# Patient Record
Sex: FEMALE | Race: BLACK OR AFRICAN AMERICAN | NOT HISPANIC OR LATINO | Employment: FULL TIME | URBAN - METROPOLITAN AREA
[De-identification: names, ages, dates, MRNs, and addresses within clinical notes are randomized per-mention and may not be internally consistent; named-entity substitution may affect disease eponyms.]

---

## 2023-05-25 ENCOUNTER — OFFICE VISIT (OUTPATIENT)
Dept: PRIMARY CARE | Facility: CLINIC | Age: 58
End: 2023-05-25
Payer: COMMERCIAL

## 2023-05-25 VITALS
WEIGHT: 207 LBS | SYSTOLIC BLOOD PRESSURE: 163 MMHG | OXYGEN SATURATION: 95 % | TEMPERATURE: 97.6 F | HEART RATE: 82 BPM | BODY MASS INDEX: 41.81 KG/M2 | DIASTOLIC BLOOD PRESSURE: 65 MMHG

## 2023-05-25 DIAGNOSIS — K80.20 GALLSTONES: Primary | ICD-10-CM

## 2023-05-25 DIAGNOSIS — Z12.31 BREAST CANCER SCREENING BY MAMMOGRAM: ICD-10-CM

## 2023-05-25 PROBLEM — M76.50 PATELLAR TENDINITIS: Status: ACTIVE | Noted: 2023-05-25

## 2023-05-25 PROBLEM — M45.9 ANKYLOSING SPONDYLITIS (MULTI): Status: ACTIVE | Noted: 2023-05-25

## 2023-05-25 PROBLEM — R51.9 ACUTE HEADACHE: Status: ACTIVE | Noted: 2023-05-25

## 2023-05-25 PROBLEM — M19.90 ARTHRITIS: Status: ACTIVE | Noted: 2023-05-25

## 2023-05-25 PROBLEM — L30.9 ECZEMA: Status: ACTIVE | Noted: 2023-05-25

## 2023-05-25 PROBLEM — M71.21: Status: ACTIVE | Noted: 2023-05-25

## 2023-05-25 PROBLEM — I10 HYPERTENSION: Status: ACTIVE | Noted: 2023-05-25

## 2023-05-25 PROBLEM — R51.9 FRONTAL HEADACHE: Status: ACTIVE | Noted: 2023-05-25

## 2023-05-25 PROBLEM — M79.673 HEEL PAIN: Status: ACTIVE | Noted: 2023-05-25

## 2023-05-25 PROBLEM — M22.2X1 PATELLOFEMORAL PAIN SYNDROME OF RIGHT KNEE: Status: ACTIVE | Noted: 2023-05-25

## 2023-05-25 PROBLEM — M54.50 CHRONIC LOW BACK PAIN: Status: ACTIVE | Noted: 2023-05-25

## 2023-05-25 PROBLEM — M17.11 PRIMARY OSTEOARTHRITIS OF RIGHT KNEE: Status: ACTIVE | Noted: 2023-05-25

## 2023-05-25 PROBLEM — B35.9 DERMATOPHYTOSIS: Status: ACTIVE | Noted: 2023-05-25

## 2023-05-25 PROBLEM — M71.22 SYNOVIAL CYST OF LEFT POPLITEAL SPACE: Status: ACTIVE | Noted: 2023-05-25

## 2023-05-25 PROBLEM — M79.89 LEFT LEG SWELLING: Status: ACTIVE | Noted: 2023-05-25

## 2023-05-25 PROBLEM — M25.472 ANKLE EDEMA, BILATERAL: Status: ACTIVE | Noted: 2023-05-25

## 2023-05-25 PROBLEM — G89.29 CHRONIC LOW BACK PAIN: Status: ACTIVE | Noted: 2023-05-25

## 2023-05-25 PROBLEM — M21.40 FLAT FOOT: Status: ACTIVE | Noted: 2023-05-25

## 2023-05-25 PROBLEM — M89.8X6 PAIN IN LEFT TIBIA: Status: ACTIVE | Noted: 2023-05-25

## 2023-05-25 PROBLEM — M54.31 RIGHT SCIATIC NERVE PAIN: Status: ACTIVE | Noted: 2023-05-25

## 2023-05-25 PROBLEM — I83.93 VARICOSE VEINS OF LEGS: Status: ACTIVE | Noted: 2023-05-25

## 2023-05-25 PROBLEM — R21 SKIN RASH: Status: ACTIVE | Noted: 2023-05-25

## 2023-05-25 PROBLEM — M84.362A STRESS FRACTURE OF LEFT TIBIA: Status: ACTIVE | Noted: 2023-05-25

## 2023-05-25 PROBLEM — M17.12 ARTHRITIS OF KNEE, LEFT: Status: ACTIVE | Noted: 2023-05-25

## 2023-05-25 PROBLEM — E55.9 VITAMIN D DEFICIENCY: Status: ACTIVE | Noted: 2023-05-25

## 2023-05-25 PROBLEM — R73.03 PREDIABETES: Status: ACTIVE | Noted: 2023-05-25

## 2023-05-25 PROBLEM — R70.0 ELEVATED SED RATE: Status: ACTIVE | Noted: 2023-05-25

## 2023-05-25 PROBLEM — M62.838 MUSCLE SPASM: Status: ACTIVE | Noted: 2023-05-25

## 2023-05-25 PROBLEM — L53.9 ERYTHEMA OF LOWER EXTREMITY: Status: ACTIVE | Noted: 2023-05-25

## 2023-05-25 PROBLEM — M25.562 LEFT KNEE PAIN: Status: ACTIVE | Noted: 2023-05-25

## 2023-05-25 PROBLEM — M25.471 ANKLE EDEMA, BILATERAL: Status: ACTIVE | Noted: 2023-05-25

## 2023-05-25 PROCEDURE — 3078F DIAST BP <80 MM HG: CPT | Performed by: STUDENT IN AN ORGANIZED HEALTH CARE EDUCATION/TRAINING PROGRAM

## 2023-05-25 PROCEDURE — 99213 OFFICE O/P EST LOW 20 MIN: CPT | Performed by: STUDENT IN AN ORGANIZED HEALTH CARE EDUCATION/TRAINING PROGRAM

## 2023-05-25 PROCEDURE — 1036F TOBACCO NON-USER: CPT | Performed by: STUDENT IN AN ORGANIZED HEALTH CARE EDUCATION/TRAINING PROGRAM

## 2023-05-25 PROCEDURE — 3077F SYST BP >= 140 MM HG: CPT | Performed by: STUDENT IN AN ORGANIZED HEALTH CARE EDUCATION/TRAINING PROGRAM

## 2023-05-25 RX ORDER — FLUOCINOLONE ACETONIDE 0.1 MG/G
CREAM TOPICAL
COMMUNITY
Start: 2023-01-14 | End: 2023-11-09 | Stop reason: WASHOUT

## 2023-05-25 RX ORDER — TRIAMCINOLONE ACETONIDE 1 MG/G
OINTMENT TOPICAL
COMMUNITY
Start: 2022-11-01 | End: 2023-11-09 | Stop reason: WASHOUT

## 2023-05-25 RX ORDER — MELOXICAM 15 MG/1
1 TABLET ORAL DAILY
COMMUNITY
Start: 2021-07-20 | End: 2023-11-09 | Stop reason: WASHOUT

## 2023-05-25 RX ORDER — CYCLOBENZAPRINE HCL 10 MG
TABLET ORAL
COMMUNITY
Start: 2019-06-03 | End: 2023-11-09 | Stop reason: WASHOUT

## 2023-05-25 RX ORDER — DESONIDE 0.5 MG/G
OINTMENT TOPICAL
COMMUNITY
Start: 2022-11-01 | End: 2023-11-09 | Stop reason: WASHOUT

## 2023-05-25 RX ORDER — FLUCONAZOLE 150 MG/1
TABLET ORAL
COMMUNITY
Start: 2023-02-09 | End: 2023-11-09 | Stop reason: WASHOUT

## 2023-05-25 RX ORDER — HYDROQUINONE 40 MG/G
CREAM TOPICAL
COMMUNITY
Start: 2023-01-14 | End: 2023-11-09 | Stop reason: WASHOUT

## 2023-05-25 RX ORDER — DOXYCYCLINE 100 MG/1
CAPSULE ORAL
COMMUNITY
Start: 2023-04-19 | End: 2023-11-09 | Stop reason: WASHOUT

## 2023-05-25 RX ORDER — KETOCONAZOLE 20 MG/G
CREAM TOPICAL
COMMUNITY
Start: 2023-01-14 | End: 2023-11-09 | Stop reason: WASHOUT

## 2023-05-25 RX ORDER — AMLODIPINE BESYLATE 5 MG/1
5 TABLET ORAL DAILY
COMMUNITY
End: 2023-09-05

## 2023-05-25 RX ORDER — DICLOFENAC SODIUM 10 MG/G
GEL TOPICAL
COMMUNITY
Start: 2023-05-08 | End: 2023-11-09 | Stop reason: WASHOUT

## 2023-05-25 RX ORDER — MOMETASONE FUROATE 1 MG/G
CREAM TOPICAL
COMMUNITY
Start: 2022-07-11 | End: 2023-11-09 | Stop reason: WASHOUT

## 2023-05-25 RX ORDER — AMLODIPINE BESYLATE 10 MG/1
10 TABLET ORAL DAILY
COMMUNITY
Start: 2022-07-12 | End: 2023-11-09 | Stop reason: WASHOUT

## 2023-05-25 RX ORDER — IBUPROFEN 600 MG/1
TABLET ORAL
COMMUNITY
End: 2023-11-09 | Stop reason: WASHOUT

## 2023-05-25 RX ORDER — ACETAMINOPHEN 650 MG/1
TABLET, FILM COATED, EXTENDED RELEASE ORAL
COMMUNITY
Start: 2023-05-24 | End: 2023-11-09 | Stop reason: WASHOUT

## 2023-05-25 RX ORDER — ASPIRIN 325 MG
50000 TABLET, DELAYED RELEASE (ENTERIC COATED) ORAL
COMMUNITY

## 2023-05-25 RX ORDER — HYDROCORTISONE 25 MG/G
CREAM TOPICAL
COMMUNITY
Start: 2023-03-01 | End: 2023-11-09 | Stop reason: WASHOUT

## 2023-05-25 NOTE — PROGRESS NOTES
Subjective   Patient ID: Miri Tavarez is a 57 y.o. female who presents for Follow-up.    HPI   See urgent care note. Was evaluated for hip and back pain, also with some RUQ discomfort. Had plain films which showed some arthritis and also found to have suspected gall stones, however this was not a dedicated study looking at her gall bladder. Patient requesting ultrasound for further evaluation.    Also requesting mammogram.    PMHx, FHx, Social Hx, Surg Hx personally reviewed at this appointment. No pertinent findings and/or changes from prior (if applicable).    ROS: Denies wt gain/loss f/c HA LoC CP SOB NVDC. See HPI above, and scanned sheet (if applicable). All other systems are reviewed and are without complaint.         Review of Systems    Objective   /65   Pulse 82   Temp 36.4 °C (97.6 °F)   Wt 93.9 kg (207 lb)   SpO2 95%   BMI 41.81 kg/m²     Physical Exam  Gen: morbidly obese, NAD. AAO x3.  HEENT: NC/AT. Anicteric sclera, symmetric pupils. MMM no thrush.  Neck: Soft, supple. No LAD. No goiter.  CV: RRR nl s1s2 no m/r/g  Pulm: CTAB no w/r/r, good air exchange  GI: obese, soft NTND BS+ no hsm. Neg Segura's.   Ext: WWP no edema  Neuro: II-XII grossly intact, nonfocal systemic findings  MSK: 5/5 strength b/l UE and LE  Gait: unremarkable     Assessment/Plan   # suspected cholelithiasis  - RUQ ultrasound  - CBC, CMP    # Mamm Screen  - ordered

## 2023-05-25 NOTE — PATIENT INSTRUCTIONS
Please stop at the lab ([address]) to complete your blood and/or urine work that I've ordered for you.    I will contact you with the results at my soonest convenience. I strongly urge you to use [employer] as this is the quickest and easiest way to access your results and recieve my correspondences.    I have ordered your mammogram today. Stop by the breast center on the fourth floor to schedule the appointment.    I have placed an order for an ultrasound. Stop by the radiology department on the first floor to schedule, or call the scheduling line at [phone].

## 2023-06-09 DIAGNOSIS — N63.10 MASS OF RIGHT BREAST, UNSPECIFIED QUADRANT: Primary | ICD-10-CM

## 2023-06-09 DIAGNOSIS — K80.20 GALLSTONES: ICD-10-CM

## 2023-06-09 NOTE — PROGRESS NOTES
R breast mass on mammogram. Order placed.    Also requesting gen surg referral for her gallstones.

## 2023-08-10 ENCOUNTER — LAB (OUTPATIENT)
Dept: LAB | Facility: LAB | Age: 58
End: 2023-08-10
Payer: COMMERCIAL

## 2023-08-10 DIAGNOSIS — K80.20 GALLSTONES: ICD-10-CM

## 2023-08-10 LAB
ALANINE AMINOTRANSFERASE (SGPT) (U/L) IN SER/PLAS: 15 U/L (ref 7–45)
ALBUMIN (G/DL) IN SER/PLAS: 3.8 G/DL (ref 3.4–5)
ALKALINE PHOSPHATASE (U/L) IN SER/PLAS: 56 U/L (ref 33–110)
ANION GAP IN SER/PLAS: 11 MMOL/L (ref 10–20)
ASPARTATE AMINOTRANSFERASE (SGOT) (U/L) IN SER/PLAS: 17 U/L (ref 9–39)
BASOPHILS (10*3/UL) IN BLOOD BY AUTOMATED COUNT: 0.03 X10E9/L (ref 0–0.1)
BASOPHILS/100 LEUKOCYTES IN BLOOD BY AUTOMATED COUNT: 0.5 % (ref 0–2)
BILIRUBIN TOTAL (MG/DL) IN SER/PLAS: 0.3 MG/DL (ref 0–1.2)
CALCIUM (MG/DL) IN SER/PLAS: 8.7 MG/DL (ref 8.6–10.3)
CARBON DIOXIDE, TOTAL (MMOL/L) IN SER/PLAS: 31 MMOL/L (ref 21–32)
CHLORIDE (MMOL/L) IN SER/PLAS: 98 MMOL/L (ref 98–107)
CREATININE (MG/DL) IN SER/PLAS: 0.73 MG/DL (ref 0.5–1.05)
EOSINOPHILS (10*3/UL) IN BLOOD BY AUTOMATED COUNT: 0.12 X10E9/L (ref 0–0.7)
EOSINOPHILS/100 LEUKOCYTES IN BLOOD BY AUTOMATED COUNT: 1.8 % (ref 0–6)
ERYTHROCYTE DISTRIBUTION WIDTH (RATIO) BY AUTOMATED COUNT: 12 % (ref 11.5–14.5)
ERYTHROCYTE MEAN CORPUSCULAR HEMOGLOBIN CONCENTRATION (G/DL) BY AUTOMATED: 32 G/DL (ref 32–36)
ERYTHROCYTE MEAN CORPUSCULAR VOLUME (FL) BY AUTOMATED COUNT: 92 FL (ref 80–100)
ERYTHROCYTES (10*6/UL) IN BLOOD BY AUTOMATED COUNT: 4.22 X10E12/L (ref 4–5.2)
GFR FEMALE: >90 ML/MIN/1.73M2
GLUCOSE (MG/DL) IN SER/PLAS: 86 MG/DL (ref 74–99)
HEMATOCRIT (%) IN BLOOD BY AUTOMATED COUNT: 38.7 % (ref 36–46)
HEMOGLOBIN (G/DL) IN BLOOD: 12.4 G/DL (ref 12–16)
IMMATURE GRANULOCYTES/100 LEUKOCYTES IN BLOOD BY AUTOMATED COUNT: 0.3 % (ref 0–0.9)
LEUKOCYTES (10*3/UL) IN BLOOD BY AUTOMATED COUNT: 6.6 X10E9/L (ref 4.4–11.3)
LYMPHOCYTES (10*3/UL) IN BLOOD BY AUTOMATED COUNT: 2.36 X10E9/L (ref 1.2–4.8)
LYMPHOCYTES/100 LEUKOCYTES IN BLOOD BY AUTOMATED COUNT: 35.6 % (ref 13–44)
MONOCYTES (10*3/UL) IN BLOOD BY AUTOMATED COUNT: 0.54 X10E9/L (ref 0.1–1)
MONOCYTES/100 LEUKOCYTES IN BLOOD BY AUTOMATED COUNT: 8.2 % (ref 2–10)
NEUTROPHILS (10*3/UL) IN BLOOD BY AUTOMATED COUNT: 3.55 X10E9/L (ref 1.2–7.7)
NEUTROPHILS/100 LEUKOCYTES IN BLOOD BY AUTOMATED COUNT: 53.6 % (ref 40–80)
PLATELETS (10*3/UL) IN BLOOD AUTOMATED COUNT: 389 X10E9/L (ref 150–450)
POTASSIUM (MMOL/L) IN SER/PLAS: 3.9 MMOL/L (ref 3.5–5.3)
PROTEIN TOTAL: 7 G/DL (ref 6.4–8.2)
SODIUM (MMOL/L) IN SER/PLAS: 136 MMOL/L (ref 136–145)
UREA NITROGEN (MG/DL) IN SER/PLAS: 11 MG/DL (ref 6–23)

## 2023-08-10 PROCEDURE — 85025 COMPLETE CBC W/AUTO DIFF WBC: CPT

## 2023-08-10 PROCEDURE — 80053 COMPREHEN METABOLIC PANEL: CPT

## 2023-08-10 PROCEDURE — 36415 COLL VENOUS BLD VENIPUNCTURE: CPT

## 2023-09-03 DIAGNOSIS — I10 HYPERTENSION, UNSPECIFIED TYPE: ICD-10-CM

## 2023-09-05 RX ORDER — AMLODIPINE BESYLATE 5 MG/1
5 TABLET ORAL DAILY
Qty: 90 TABLET | Refills: 0 | Status: SHIPPED | OUTPATIENT
Start: 2023-09-05 | End: 2024-01-12

## 2023-09-05 RX ORDER — HYDROCHLOROTHIAZIDE 25 MG/1
25 TABLET ORAL DAILY
Qty: 90 TABLET | Refills: 0 | Status: SHIPPED | OUTPATIENT
Start: 2023-09-05 | End: 2024-01-12

## 2023-10-06 ENCOUNTER — ANESTHESIA EVENT (OUTPATIENT)
Dept: OPERATING ROOM | Facility: HOSPITAL | Age: 58
End: 2023-10-06
Payer: COMMERCIAL

## 2023-10-09 ENCOUNTER — APPOINTMENT (OUTPATIENT)
Dept: RADIOLOGY | Facility: HOSPITAL | Age: 58
End: 2023-10-09
Payer: COMMERCIAL

## 2023-10-09 ENCOUNTER — ANESTHESIA (OUTPATIENT)
Dept: OPERATING ROOM | Facility: HOSPITAL | Age: 58
End: 2023-10-09
Payer: COMMERCIAL

## 2023-10-09 ENCOUNTER — HOSPITAL ENCOUNTER (OUTPATIENT)
Facility: HOSPITAL | Age: 58
Setting detail: OUTPATIENT SURGERY
Discharge: HOME | End: 2023-10-09
Attending: SURGERY | Admitting: SURGERY
Payer: COMMERCIAL

## 2023-10-09 VITALS
HEIGHT: 59 IN | BODY MASS INDEX: 41.78 KG/M2 | TEMPERATURE: 97 F | WEIGHT: 207.23 LBS | RESPIRATION RATE: 19 BRPM | DIASTOLIC BLOOD PRESSURE: 55 MMHG | SYSTOLIC BLOOD PRESSURE: 113 MMHG | HEART RATE: 74 BPM | OXYGEN SATURATION: 90 %

## 2023-10-09 DIAGNOSIS — K83.1 OBSTRUCTION OF BILE DUCT (CMS-HCC): Primary | ICD-10-CM

## 2023-10-09 DIAGNOSIS — K80.50 CALCULUS OF BILE DUCT WITHOUT CHOLANGITIS OR CHOLECYSTITIS WITHOUT OBSTRUCTION: ICD-10-CM

## 2023-10-09 PROBLEM — E66.9 OBESITY: Status: ACTIVE | Noted: 2023-10-09

## 2023-10-09 PROCEDURE — 2720000007 HC OR 272 NO HCPCS: Performed by: SURGERY

## 2023-10-09 PROCEDURE — 2500000001 HC RX 250 WO HCPCS SELF ADMINISTERED DRUGS (ALT 637 FOR MEDICARE OP): Performed by: NURSE ANESTHETIST, CERTIFIED REGISTERED

## 2023-10-09 PROCEDURE — 7100000009 HC PHASE TWO TIME - INITIAL BASE CHARGE: Performed by: SURGERY

## 2023-10-09 PROCEDURE — 2580000001 HC RX 258 IV SOLUTIONS: Performed by: ANESTHESIOLOGY

## 2023-10-09 PROCEDURE — 3600000004 HC OR TIME - INITIAL BASE CHARGE - PROCEDURE LEVEL FOUR: Performed by: SURGERY

## 2023-10-09 PROCEDURE — 7100000002 HC RECOVERY ROOM TIME - EACH INCREMENTAL 1 MINUTE: Performed by: SURGERY

## 2023-10-09 PROCEDURE — 88304 TISSUE EXAM BY PATHOLOGIST: CPT | Mod: TC,AHULAB | Performed by: SURGERY

## 2023-10-09 PROCEDURE — 3600000009 HC OR TIME - EACH INCREMENTAL 1 MINUTE - PROCEDURE LEVEL FOUR: Performed by: SURGERY

## 2023-10-09 PROCEDURE — 7100000010 HC PHASE TWO TIME - EACH INCREMENTAL 1 MINUTE: Performed by: SURGERY

## 2023-10-09 PROCEDURE — A4217 STERILE WATER/SALINE, 500 ML: HCPCS | Performed by: SURGERY

## 2023-10-09 PROCEDURE — 2500000005 HC RX 250 GENERAL PHARMACY W/O HCPCS: Performed by: NURSE ANESTHETIST, CERTIFIED REGISTERED

## 2023-10-09 PROCEDURE — 2500000004 HC RX 250 GENERAL PHARMACY W/ HCPCS (ALT 636 FOR OP/ED): Performed by: SURGERY

## 2023-10-09 PROCEDURE — 2500000001 HC RX 250 WO HCPCS SELF ADMINISTERED DRUGS (ALT 637 FOR MEDICARE OP): Performed by: ANESTHESIOLOGY

## 2023-10-09 PROCEDURE — 74300 X-RAY BILE DUCTS/PANCREAS: CPT

## 2023-10-09 PROCEDURE — 88304 TISSUE EXAM BY PATHOLOGIST: CPT | Mod: TC,SUR,AHULAB,CMCLAB | Performed by: SURGERY

## 2023-10-09 PROCEDURE — 3700000001 HC GENERAL ANESTHESIA TIME - INITIAL BASE CHARGE: Performed by: SURGERY

## 2023-10-09 PROCEDURE — 88304 TISSUE EXAM BY PATHOLOGIST: CPT | Performed by: PATHOLOGY

## 2023-10-09 PROCEDURE — 7100000001 HC RECOVERY ROOM TIME - INITIAL BASE CHARGE: Performed by: SURGERY

## 2023-10-09 PROCEDURE — 3700000002 HC GENERAL ANESTHESIA TIME - EACH INCREMENTAL 1 MINUTE: Performed by: SURGERY

## 2023-10-09 PROCEDURE — 47563 LAPARO CHOLECYSTECTOMY/GRAPH: CPT | Performed by: SURGERY

## 2023-10-09 PROCEDURE — 2550000001 HC RX 255 CONTRASTS: Performed by: SURGERY

## 2023-10-09 PROCEDURE — A47563 PR LAP,CHOLECYSTECTOMY/GRAPH: Performed by: ANESTHESIOLOGY

## 2023-10-09 PROCEDURE — 2500000004 HC RX 250 GENERAL PHARMACY W/ HCPCS (ALT 636 FOR OP/ED): Performed by: ANESTHESIOLOGY

## 2023-10-09 PROCEDURE — 2500000005 HC RX 250 GENERAL PHARMACY W/O HCPCS: Performed by: SURGERY

## 2023-10-09 PROCEDURE — A47563 PR LAP,CHOLECYSTECTOMY/GRAPH: Performed by: NURSE ANESTHETIST, CERTIFIED REGISTERED

## 2023-10-09 PROCEDURE — 2500000004 HC RX 250 GENERAL PHARMACY W/ HCPCS (ALT 636 FOR OP/ED): Performed by: NURSE ANESTHETIST, CERTIFIED REGISTERED

## 2023-10-09 RX ORDER — SODIUM CHLORIDE, SODIUM LACTATE, POTASSIUM CHLORIDE, CALCIUM CHLORIDE 600; 310; 30; 20 MG/100ML; MG/100ML; MG/100ML; MG/100ML
100 INJECTION, SOLUTION INTRAVENOUS CONTINUOUS
Status: DISCONTINUED | OUTPATIENT
Start: 2023-10-09 | End: 2023-10-09 | Stop reason: HOSPADM

## 2023-10-09 RX ORDER — FENTANYL CITRATE 50 UG/ML
INJECTION, SOLUTION INTRAMUSCULAR; INTRAVENOUS AS NEEDED
Status: DISCONTINUED | OUTPATIENT
Start: 2023-10-09 | End: 2023-10-09

## 2023-10-09 RX ORDER — HYDRALAZINE HYDROCHLORIDE 20 MG/ML
5 INJECTION INTRAMUSCULAR; INTRAVENOUS EVERY 30 MIN PRN
Status: DISCONTINUED | OUTPATIENT
Start: 2023-10-09 | End: 2023-10-09 | Stop reason: HOSPADM

## 2023-10-09 RX ORDER — BUPIVACAINE HYDROCHLORIDE 5 MG/ML
INJECTION, SOLUTION PERINEURAL AS NEEDED
Status: DISCONTINUED | OUTPATIENT
Start: 2023-10-09 | End: 2023-10-09 | Stop reason: HOSPADM

## 2023-10-09 RX ORDER — ROCURONIUM BROMIDE 10 MG/ML
INJECTION, SOLUTION INTRAVENOUS AS NEEDED
Status: DISCONTINUED | OUTPATIENT
Start: 2023-10-09 | End: 2023-10-09

## 2023-10-09 RX ORDER — MIDAZOLAM HYDROCHLORIDE 1 MG/ML
INJECTION INTRAMUSCULAR; INTRAVENOUS AS NEEDED
Status: DISCONTINUED | OUTPATIENT
Start: 2023-10-09 | End: 2023-10-09

## 2023-10-09 RX ORDER — SODIUM CHLORIDE 0.9 G/100ML
IRRIGANT IRRIGATION AS NEEDED
Status: DISCONTINUED | OUTPATIENT
Start: 2023-10-09 | End: 2023-10-09 | Stop reason: HOSPADM

## 2023-10-09 RX ORDER — OXYCODONE HYDROCHLORIDE 5 MG/1
5 TABLET ORAL EVERY 4 HOURS PRN
Status: DISCONTINUED | OUTPATIENT
Start: 2023-10-09 | End: 2023-10-09 | Stop reason: HOSPADM

## 2023-10-09 RX ORDER — PROPOFOL 10 MG/ML
INJECTION, EMULSION INTRAVENOUS AS NEEDED
Status: DISCONTINUED | OUTPATIENT
Start: 2023-10-09 | End: 2023-10-09

## 2023-10-09 RX ORDER — DEXAMETHASONE SODIUM PHOSPHATE 4 MG/ML
INJECTION, SOLUTION INTRA-ARTICULAR; INTRALESIONAL; INTRAMUSCULAR; INTRAVENOUS; SOFT TISSUE AS NEEDED
Status: DISCONTINUED | OUTPATIENT
Start: 2023-10-09 | End: 2023-10-09

## 2023-10-09 RX ORDER — LIDOCAINE HYDROCHLORIDE 10 MG/ML
0.1 INJECTION, SOLUTION EPIDURAL; INFILTRATION; INTRACAUDAL; PERINEURAL ONCE
Status: DISCONTINUED | OUTPATIENT
Start: 2023-10-09 | End: 2023-10-09 | Stop reason: HOSPADM

## 2023-10-09 RX ORDER — CEFAZOLIN SODIUM 2 G/100ML
INJECTION, SOLUTION INTRAVENOUS AS NEEDED
Status: DISCONTINUED | OUTPATIENT
Start: 2023-10-09 | End: 2023-10-09

## 2023-10-09 RX ORDER — POLYETHYLENE GLYCOL 3350 17 G/17G
17 POWDER, FOR SOLUTION ORAL DAILY PRN
Qty: 10 PACKET | Refills: 0 | Status: SHIPPED | OUTPATIENT
Start: 2023-10-09 | End: 2023-11-09 | Stop reason: WASHOUT

## 2023-10-09 RX ORDER — KETOROLAC TROMETHAMINE 30 MG/ML
INJECTION, SOLUTION INTRAMUSCULAR; INTRAVENOUS AS NEEDED
Status: DISCONTINUED | OUTPATIENT
Start: 2023-10-09 | End: 2023-10-09

## 2023-10-09 RX ORDER — LIDOCAINE HYDROCHLORIDE 10 MG/ML
2 INJECTION, SOLUTION EPIDURAL; INFILTRATION; INTRACAUDAL; PERINEURAL ONCE
Status: DISCONTINUED | OUTPATIENT
Start: 2023-10-09 | End: 2023-10-09 | Stop reason: HOSPADM

## 2023-10-09 RX ORDER — OXYCODONE HYDROCHLORIDE 5 MG/1
5 TABLET ORAL EVERY 6 HOURS PRN
Qty: 12 TABLET | Refills: 0 | Status: SHIPPED | OUTPATIENT
Start: 2023-10-09 | End: 2023-11-09 | Stop reason: WASHOUT

## 2023-10-09 RX ORDER — ONDANSETRON HYDROCHLORIDE 2 MG/ML
INJECTION, SOLUTION INTRAVENOUS AS NEEDED
Status: DISCONTINUED | OUTPATIENT
Start: 2023-10-09 | End: 2023-10-09

## 2023-10-09 RX ORDER — ONDANSETRON HYDROCHLORIDE 2 MG/ML
4 INJECTION, SOLUTION INTRAVENOUS ONCE AS NEEDED
Status: DISCONTINUED | OUTPATIENT
Start: 2023-10-09 | End: 2023-10-09 | Stop reason: HOSPADM

## 2023-10-09 RX ORDER — ALBUTEROL SULFATE 0.83 MG/ML
2.5 SOLUTION RESPIRATORY (INHALATION) ONCE AS NEEDED
Status: DISCONTINUED | OUTPATIENT
Start: 2023-10-09 | End: 2023-10-09 | Stop reason: HOSPADM

## 2023-10-09 RX ORDER — LIDOCAINE HYDROCHLORIDE 20 MG/ML
INJECTION, SOLUTION EPIDURAL; INFILTRATION; INTRACAUDAL; PERINEURAL AS NEEDED
Status: DISCONTINUED | OUTPATIENT
Start: 2023-10-09 | End: 2023-10-09

## 2023-10-09 RX ORDER — DIPHENHYDRAMINE HYDROCHLORIDE 50 MG/ML
12.5 INJECTION INTRAMUSCULAR; INTRAVENOUS ONCE AS NEEDED
Status: DISCONTINUED | OUTPATIENT
Start: 2023-10-09 | End: 2023-10-09 | Stop reason: HOSPADM

## 2023-10-09 RX ORDER — IBUPROFEN 600 MG/1
600 TABLET ORAL EVERY 6 HOURS PRN
Qty: 20 TABLET | Refills: 0 | Status: SHIPPED | OUTPATIENT
Start: 2023-10-09

## 2023-10-09 RX ADMIN — ROCURONIUM BROMIDE 50 MG: 10 INJECTION, SOLUTION INTRAVENOUS at 07:46

## 2023-10-09 RX ADMIN — DEXAMETHASONE SODIUM PHOSPHATE 8 MG: 4 INJECTION, SOLUTION INTRAMUSCULAR; INTRAVENOUS at 07:48

## 2023-10-09 RX ADMIN — SODIUM CHLORIDE, POTASSIUM CHLORIDE, SODIUM LACTATE AND CALCIUM CHLORIDE 100 ML/HR: 600; 310; 30; 20 INJECTION, SOLUTION INTRAVENOUS at 09:39

## 2023-10-09 RX ADMIN — HYDROMORPHONE HYDROCHLORIDE 0.5 MG: 1 INJECTION, SOLUTION INTRAMUSCULAR; INTRAVENOUS; SUBCUTANEOUS at 10:09

## 2023-10-09 RX ADMIN — Medication 1 APPLICATION: at 07:50

## 2023-10-09 RX ADMIN — HYDROMORPHONE HYDROCHLORIDE 0.25 MG: 1 INJECTION, SOLUTION INTRAMUSCULAR; INTRAVENOUS; SUBCUTANEOUS at 09:36

## 2023-10-09 RX ADMIN — FENTANYL CITRATE 50 MCG: 50 INJECTION, SOLUTION INTRAMUSCULAR; INTRAVENOUS at 07:58

## 2023-10-09 RX ADMIN — LIDOCAINE HYDROCHLORIDE 80 MG: 20 INJECTION, SOLUTION EPIDURAL; INFILTRATION; INTRACAUDAL; PERINEURAL at 07:46

## 2023-10-09 RX ADMIN — CEFAZOLIN SODIUM 2 G: 2 INJECTION, SOLUTION INTRAVENOUS at 07:49

## 2023-10-09 RX ADMIN — OXYCODONE HYDROCHLORIDE 5 MG: 5 TABLET ORAL at 10:41

## 2023-10-09 RX ADMIN — MIDAZOLAM HYDROCHLORIDE 2 MG: 1 INJECTION, SOLUTION INTRAMUSCULAR; INTRAVENOUS at 07:34

## 2023-10-09 RX ADMIN — SODIUM CHLORIDE, POTASSIUM CHLORIDE, SODIUM LACTATE AND CALCIUM CHLORIDE 100 ML/HR: 600; 310; 30; 20 INJECTION, SOLUTION INTRAVENOUS at 06:33

## 2023-10-09 RX ADMIN — KETOROLAC TROMETHAMINE 30 MG: 30 INJECTION, SOLUTION INTRAMUSCULAR; INTRAVENOUS at 08:44

## 2023-10-09 RX ADMIN — ONDANSETRON 4 MG: 2 INJECTION INTRAMUSCULAR; INTRAVENOUS at 08:43

## 2023-10-09 RX ADMIN — FENTANYL CITRATE 50 MCG: 50 INJECTION, SOLUTION INTRAMUSCULAR; INTRAVENOUS at 07:40

## 2023-10-09 RX ADMIN — PROPOFOL 200 MG: 10 INJECTION, EMULSION INTRAVENOUS at 07:46

## 2023-10-09 RX ADMIN — SUGAMMADEX 200 MG: 100 INJECTION, SOLUTION INTRAVENOUS at 08:50

## 2023-10-09 SDOH — HEALTH STABILITY: MENTAL HEALTH: CURRENT SMOKER: 0

## 2023-10-09 ASSESSMENT — PAIN SCALES - GENERAL
PAINLEVEL_OUTOF10: 5 - MODERATE PAIN
PAINLEVEL_OUTOF10: 0 - NO PAIN
PAINLEVEL_OUTOF10: 3
PAINLEVEL_OUTOF10: 5 - MODERATE PAIN
PAINLEVEL_OUTOF10: 7
PAINLEVEL_OUTOF10: 7
PAINLEVEL_OUTOF10: 3
PAINLEVEL_OUTOF10: 0 - NO PAIN
PAINLEVEL_OUTOF10: 7
PAINLEVEL_OUTOF10: 0 - NO PAIN
PAINLEVEL_OUTOF10: 5 - MODERATE PAIN

## 2023-10-09 ASSESSMENT — PAIN - FUNCTIONAL ASSESSMENT
PAIN_FUNCTIONAL_ASSESSMENT: 0-10

## 2023-10-09 ASSESSMENT — COLUMBIA-SUICIDE SEVERITY RATING SCALE - C-SSRS
2. HAVE YOU ACTUALLY HAD ANY THOUGHTS OF KILLING YOURSELF?: NO
6. HAVE YOU EVER DONE ANYTHING, STARTED TO DO ANYTHING, OR PREPARED TO DO ANYTHING TO END YOUR LIFE?: NO
1. IN THE PAST MONTH, HAVE YOU WISHED YOU WERE DEAD OR WISHED YOU COULD GO TO SLEEP AND NOT WAKE UP?: NO

## 2023-10-09 ASSESSMENT — ENCOUNTER SYMPTOMS: ABDOMINAL PAIN: 1

## 2023-10-09 NOTE — ANESTHESIA PREPROCEDURE EVALUATION
Patient: Miri Tavarez    No PSH. Medical history as below.    Procedure Information       Date/Time: 10/09/23 0730    Procedure: Laparoscopic Cholecystectomy    Location: [facility] OR 06 / [facility] OR    Surgeons: Rishi Mccartney MD            Relevant Problems   Cardiovascular   (+) Hypertension      Endocrine   (+) Obesity      Neuro/Psych   (+) Right sciatic nerve pain      Musculoskeletal   (+) Chronic low back pain   (+) Primary osteoarthritis of right knee      Infectious Disease   (+) Dermatophytosis      Other   (+) Ankylosing spondylitis (CMS/HCC)   (+) Arthritis   (+) Arthritis of knee, left   (+) Patellar tendinitis       Clinical information reviewed:   Tobacco  Allergies  Meds   Med Hx  Surg Hx  OB Status  Fam Hx  Soc   Hx        NPO Detail:  NPO/Void Status  Date of Last Liquid: 10/08/23  Time of Last Liquid: 2200  Date of Last Solid: 10/08/23  Time of Last Solid: 2200  Time of Last Void: 0530         Physical Exam    Airway  Mallampati: II  TM distance: >3 FB     Cardiovascular    Dental   (+) upper dentures     Pulmonary    Abdominal            Anesthesia Plan    ASA 3     general     The patient is not a current smoker.    intravenous induction   Anesthetic plan and risks discussed with patient.  Use of blood products discussed with patient who.

## 2023-10-09 NOTE — OP NOTE
Laparoscopic Cholecystectomy; Cholangiograms Operative Note     Date: 10/9/2023  OR Location: [facility] OR    Name: Miri Tavarez, : 1965, Age: 57 y.o., MRN: [id number], Sex: female    Diagnosis  Pre-op Diagnosis     * Calculus of bile duct without cholangitis or cholecystitis without obstruction [K80.50] Post-op Diagnosis     * Calculus of bile duct without cholangitis or cholecystitis without obstruction [K80.50]     Procedures    * Laparoscopic Cholecystectomy; Cholangiograms    Surgeons      * Rishi Mccartney - Primary    Resident/Fellow/Other Assistant:  PGY 4     Procedure Summary  Anesthesia: General  ASA: II  Anesthesia Staff: Anesthesiologist: Karissa East MD  CRNA: YURI Pablo-CRNA  Estimated Blood Loss: 10mL  Intra-op Medications:   Medication Name Total Dose   sodium chloride 0.9 % irrigation solution 1,000 mL   ioversol (Optiray-320) injection 20 mL              Anesthesia Record               Intraprocedure I/O Totals          Intake    ceFAZolin (Ancef) IVPB 2 g/100 mL D5 (premix) 20.00 mL    Total Intake 20 mL       Output    Est. Blood Loss 5 mL    Total Output 5 mL       Net    Net Volume 15 mL          Specimen:   ID Type Source Tests Collected by Time   1 : Gallbladder Tissue GALLBLADDER CHOLECYSTECTOMY SURGICAL PATHOLOGY EXAM Rishi Mccartney MD 10/9/2023 0810        Staff:   Circulator: Zahira Garcia RN  Scrub Person: Taylor Alberto RN         Drains and/or Catheters: * None in log *    Tourniquet Times:         Implants:     Findings: IOC ok.  Stone in neck     Indications: Miri Tavarez is an 57 y.o. female who is having surgery for Calculus of bile duct without cholangitis or cholecystitis without obstruction [K80.50].     The patient was seen in the preoperative area. The risks, benefits, complications, treatment options, non-operative alternatives, expected recovery and outcomes were discussed with the patient. The possibilities of reaction to medication, pulmonary  aspiration, injury to surrounding structures, bleeding, recurrent infection, the need for additional procedures, failure to diagnose a condition, and creating a complication requiring transfusion or operation were discussed with the patient. The patient concurred with the proposed plan, giving informed consent.  The site of surgery was properly noted/marked if necessary per policy. The patient has been actively warmed in preoperative area. Preoperative antibiotics given. Venous thrombosis prophylaxis have been ordered including bilateral sequential compression devices    Procedure Details:  Description:  Patient was brought to the operating room placed supine on the table.  Timeout was performed which confirmed patient and procedure.  Perioperative antibiotics were administered.  Gen. anesthesia was administered through an endotracheal tube.  The abdomen was prepped and draped sterilely.    I injected half percent Marcaine and then made a sharp infraumbilical incision with the knife.  Sharp incision was made in the fascia.  The peritoneal cavity was entered under direct visualization and a Joy port was placed.  The abdomen was insufflated and the patient was placed in steep reverse Trendelenburg.  A 5 mm 30° camera was introduced.  I placed a right upper quadrant 5 mm port and another 5 mL port in the midline subxiphoid area.  The gallbladder was visualized.  It was grasped and retracted superiorly into the right.  Meticulous dissection was then performed in the area of Calot triangle.  Critical view was achieved.  Posterior cystic plate visualized.  The cystic artery and cystic duct were skeletonized.  The artery was divided between hemoclips.  Endo Clip placed on the gallbladder side and then made a ductotomy with EndoShears.  Ranfac cholangiocatheter was inserted through a separate angiocatheter sheath.  Cholangiogram was obtained using C-arm fluoroscopy.  The anatomy was noted to be normal.  No obvious  filling defects seen. Ranfac catheter removed.  I placed 3 clips on the distal cystic duct and one toward the gallbladder and divided with EndoShears.  The gallbladder was then dissected atraumatically out of the liver bed with hook cautery.  Once it was liberated it was placed in the Endo Catch bag and brought out through the umbilicus.  Liver bed was inspected and noted to be hemostatic.  Clips were noted to be in good position.  Gentle irrigation was performed.  The effluent was noted to be clear.  The ports were removed under direct visualization.  The abdomen was desufflated.  The fascia at the umbilicus was closed with 0 Vicryl.  Wounds were irrigated.  Skin incisions were closed with 4-0 Biosyn and Dermabond.  Patient was ultimately extubated and transferred to the recovery room in satisfactory condition.    Complications:  None; patient tolerated the procedure well.    Disposition: PACU - hemodynamically stable.  Condition: stable         Additional Details:     Attending Attestation: I was present and scrubbed for the entire procedure.    Rishi Mccartney  Phone Number: [phone]

## 2023-10-09 NOTE — ANESTHESIA POSTPROCEDURE EVALUATION
Patient: Miri Tavarez    Procedure Summary       Date: 10/09/23 Room [facility]    Anesthesia Start: 0734 Anesthesia Stop: 0906    Procedure: Laparoscopic Cholecystectomy; Cholangiograms Diagnosis:       Calculus of bile duct without cholangitis or cholecystitis without obstruction      (Calculus of bile duct without cholangitis or cholecystitis without obstruction [K80.50])    Surgeons: Rishi Mccartney MD Responsible Provider: Karissa East MD    Anesthesia Type: general ASA Status: 2            Anesthesia Type: general    Vitals Value Taken Time   /61 10/09/23 0946   Temp 36 °C (96.8 °F) 10/09/23 0901   Pulse 78 10/09/23 0954   Resp 20 10/09/23 0945   SpO2 90 % 10/09/23 0954   Vitals shown include unvalidated device data.    Anesthesia Post Evaluation    Patient location during evaluation: PACU  Patient participation: complete - patient participated  Level of consciousness: awake  Pain management: adequate  Airway patency: patent  Cardiovascular status: acceptable  Respiratory status: acceptable  Hydration status: acceptable        No notable events documented.

## 2023-10-09 NOTE — H&P
History Of Present Illness  Miri Tavarez is a 57 y.o. female presenting with hx biliary colic.     Past Medical History  Past Medical History:   Diagnosis Date    Acute vaginitis 08/22/2018    Acute vaginitis    Encounter for gynecological examination (general) (routine) without abnormal findings 09/18/2016    Visit for routine gyn exam    Localized edema 04/24/2017    Lower leg edema    Otalgia, left ear 04/27/2017    Otalgia, left    Pain in left shoulder 09/21/2016    Pain in shoulder region, left    Personal history of other diseases of the nervous system and sense organs 02/09/2018    History of drainage from eye    Personal history of other diseases of the respiratory system 02/01/2018    History of acute pharyngitis    Personal history of other infectious and parasitic diseases 08/17/2018    History of dermatophytosis    Reaction to severe stress, unspecified 03/14/2016    Situational stress    Shortness of breath 05/08/2018    SOB (shortness of breath) on exertion    Sprain of unspecified ligament of right ankle, initial encounter 03/14/2016    Right ankle sprain    Strain of unspecified muscle, fascia and tendon at shoulder and upper arm level, right arm, initial encounter 04/24/2017    Right shoulder strain    Unspecified acute conjunctivitis, left eye 02/09/2018    Acute bacterial conjunctivitis of left eye       Surgical History  Past Surgical History:   Procedure Laterality Date    OTHER SURGICAL HISTORY  07/31/2020    No history of surgery        Social History  She reports that she has never smoked. She has never used smokeless tobacco. She reports that she does not drink alcohol and does not use drugs.    Family History  No family history on file.     Allergies  Patient has no known allergies.    Review of Systems   Gastrointestinal:  Positive for abdominal pain.        Physical Exam  Constitutional:       Appearance: Normal appearance.   Cardiovascular:      Rate and Rhythm: Normal rate and  "regular rhythm.   Pulmonary:      Effort: Pulmonary effort is normal.      Breath sounds: Normal breath sounds.   Abdominal:      General: Abdomen is flat. Bowel sounds are normal.      Palpations: Abdomen is soft.   Musculoskeletal:         General: Normal range of motion.   Skin:     General: Skin is warm.   Neurological:      General: No focal deficit present.      Mental Status: She is alert and oriented to person, place, and time.          Last Recorded Vitals  Blood pressure 159/65, pulse 64, temperature 36.4 °C (97.5 °F), temperature source Temporal, resp. rate 16, height 1.499 m (4' 11\"), weight 94 kg (207 lb 3.7 oz), SpO2 99 %.    Relevant Results  US reviewed       Narrative & Impression   Interpreted By:  NATHAN GARSIA MD  MRN: [id number]  Patient Name: LUANNE ENGEL     STUDY:  US GALLBLADDER;  6/7/2023 9:04 am     INDICATION:  RUQ pain, likely cholelithiasis seen on lumbar x-rays.     COMPARISON:  Lumbar spine x-ray series of 05/04/2023. No prior ultrasound  available.     ACCESSION NUMBER(S):  [id number]     ORDERING CLINICIAN:  CHRISTIAN BEE     TECHNIQUE:  Real-time sonographic evaluation of the right upper quadrant was  performed.     FINDINGS:  LIVER:  The liver is homogeneous in echotexture. No focal hepatic lesion is  identified.  Liver is borderline in size measuring 18 cm in sagittal  dimension.     GALLBLADDER:  Gallbladder contains a large hyperechoic shadowing gallstone.  Gallbladder wall is not significantly thickened measuring 3 mm.  Sonographic Segura sign is negative.     BILIARY TREE:  Common bile duct is not dilated measuring 3 mm in diameter.     PANCREAS:  The visualized pancreas is unremarkable in appearance. Pancreatic  distal body and tail are obscured by bowel gas.     RIGHT KIDNEY:  Right kidney measures  9.6cm in length.  No right hydronephrosis is  seen. Right central renal small round hypoechoic cyst measures 1 cm  in diameter.     IMPRESSION:  Cholelithiasis. No " gallbladder wall thickening. No biliary dilation.     Borderline size of the liver. No focal hepatic lesion demonstrated.        Assessment/Plan   Active Problems:  There are no active Hospital Problems.      Plan for laparoscopic cholecystectomy        I spent 10 minutes in the professional and overall care of this patient.      Rishi Mccartney MD

## 2023-10-09 NOTE — ANESTHESIA PROCEDURE NOTES
Airway  Date/Time: 10/9/2023 7:46 AM  Urgency: elective    Airway not difficult    Staffing  Performed: RADHA   Authorized by: Karissa East MD    Performed by: YURI Pablo-CHIQUITA  Patient location during procedure: OR    Indications and Patient Condition  Indications for airway management: anesthesia  Spontaneous Ventilation: absent  Sedation level: deep  Preoxygenated: yes  Patient position: sniffing  MILS maintained throughout  Mask difficulty assessment: 1 - vent by mask  No planned trial extubation    Final Airway Details  Final airway type: endotracheal airway      Successful airway: ETT  Cuffed: yes   Successful intubation technique: direct laryngoscopy  Facilitating devices/methods: cricoid pressure and intubating stylet  Blade: Essie  Blade size: #3  ETT size (mm): 7.0  Cormack-Lehane Classification: grade IIa - partial view of glottis  Placement verified by: chest auscultation and capnometry   Cuff volume (mL): 7  Measured from: lips  ETT to lips (cm): 21  Number of attempts at approach: 1  Number of other approaches attempted: 0    Additional Comments  Intubation by Radha Mcgraw

## 2023-10-09 NOTE — PERIOPERATIVE NURSING NOTE
0901: Patient to PACU [address], on 6L SFM, patient is sedated, VSS, 3 port sites in abd, hand-off report complete from anesthesia.    0915: Patient family updated via text    0936: Patient rating abd pain 5/10, medicated patient per MAR with 0.25 mg of IV dilaudid    0945: Patient resting comfortably    1009: Patient rating her abd pain 7/10, medicated patient per MAR with 0.5 mg of IV diluadid    1015: Instructing patient on Incentive Spirometry d/t low pulse ox, pulse ox climbing from 89% to 95%    1030: Patient Discharge Criteria Score is 11 and criteria has been met    1040: Family at bedside, assisting patient to get dressed and ready to go home.    1045: Patient ambulating to the bathroom with standby assist    1050: Discharge instructions reviewed with patient and her family at bedside. Both acknowledged DC instructions and POC after DC.    1055: IV removed, catheter lumen intact. Patient placed in transport    1103: Patient left PACU [address] for main lobby via  via transport

## 2023-10-10 ASSESSMENT — PAIN SCALES - GENERAL: PAINLEVEL_OUTOF10: 0 - NO PAIN

## 2023-10-11 LAB
LABORATORY COMMENT REPORT: NORMAL
PATH REPORT.FINAL DX SPEC: NORMAL
PATH REPORT.GROSS SPEC: NORMAL
PATH REPORT.RELEVANT HX SPEC: NORMAL
PATH REPORT.TOTAL CANCER: NORMAL

## 2023-10-22 PROBLEM — L21.8 OTHER SEBORRHEIC DERMATITIS: Status: ACTIVE | Noted: 2020-11-13

## 2023-10-22 PROBLEM — L63.0 ALOPECIA (CAPITIS) TOTALIS: Status: ACTIVE | Noted: 2020-11-13

## 2023-10-22 RX ORDER — KETOCONAZOLE 20 MG/ML
SHAMPOO, SUSPENSION TOPICAL
COMMUNITY
Start: 2023-10-01 | End: 2023-11-09 | Stop reason: WASHOUT

## 2023-10-22 RX ORDER — BIMATOPROST 3 UG/ML
1 SOLUTION TOPICAL
COMMUNITY
Start: 2015-12-04 | End: 2023-11-09 | Stop reason: WASHOUT

## 2023-10-22 RX ORDER — CLINDAMYCIN PHOSPHATE 10 MG/G
GEL TOPICAL EVERY MORNING
COMMUNITY

## 2023-10-22 RX ORDER — BETAMETHASONE DIPROPIONATE 0.5 MG/G
1 OINTMENT TOPICAL
COMMUNITY
Start: 2015-09-25 | End: 2023-11-09 | Stop reason: WASHOUT

## 2023-10-22 RX ORDER — PREDNISONE 20 MG/1
2 TABLET ORAL EVERY MORNING
COMMUNITY
Start: 2023-09-26 | End: 2023-11-09 | Stop reason: WASHOUT

## 2023-10-22 RX ORDER — CICLOPIROX OLAMINE 7.7 MG/G
CREAM TOPICAL 2 TIMES DAILY
COMMUNITY
End: 2023-11-09 | Stop reason: WASHOUT

## 2023-10-24 ENCOUNTER — OFFICE VISIT (OUTPATIENT)
Dept: SURGERY | Facility: CLINIC | Age: 58
End: 2023-10-24
Payer: COMMERCIAL

## 2023-10-24 DIAGNOSIS — Z09 SURGERY FOLLOW-UP: Primary | ICD-10-CM

## 2023-10-24 PROCEDURE — 99024 POSTOP FOLLOW-UP VISIT: CPT | Performed by: SURGERY

## 2023-10-24 PROCEDURE — 1036F TOBACCO NON-USER: CPT | Performed by: SURGERY

## 2023-10-24 NOTE — PROGRESS NOTES
Miri Tavarez is a 57 y.o. female on day 0 of admission presenting with No Principal Problem: There is no principal problem currently on the Problem List. Please update the Problem List and refresh..    Assessment/Plan   Excellent recovery following recent laparoscopic cholecystectomy  -We reviewed intraoperative findings and final pathology report  -Patient encouraged to resume regular activities including exercise as tolerated  -Avoid greasy and fatty foods first couple months after surgery  -Follow-up with me as needed    Subjective   Ms. Tavarez doing well following recent laparoscopic cholecystectomy.  She has had some questions about when she go back to work.       Objective     Physical Exam  NAD  A&Ox3  Non icteric  CTA  RR  Abdomen soft min tender. Wounds clean, intact  Extremities warm, well perfused     Last Recorded Vitals  There were no vitals taken for this visit.  Intake/Output last 3 Shifts:  No intake/output data recorded.    Relevant Results    Scheduled medications    Continuous medications    PRN medications      No results found for this or any previous visit (from the past 24 hour(s)).        I spent 25 minutes in the professional and overall care of this patient.      Rishi Mccartney MD

## 2023-10-24 NOTE — LETTER
Jay French.  Miri is doing well following recent laparoscopic cholecystectomy.  I will see her again in the future as needed.  Thanks again    Monty

## 2023-10-24 NOTE — PROGRESS NOTES
History Of Present Illness  Miri Tavarez is a 57 y.o. female presenting with ***.     Past Medical History  Past Medical History:   Diagnosis Date    Acute vaginitis 08/22/2018    Acute vaginitis    Encounter for gynecological examination (general) (routine) without abnormal findings 09/18/2016    Visit for routine gyn exam    Localized edema 04/24/2017    Lower leg edema    Otalgia, left ear 04/27/2017    Otalgia, left    Pain in left shoulder 09/21/2016    Pain in shoulder region, left    Personal history of other diseases of the nervous system and sense organs 02/09/2018    History of drainage from eye    Personal history of other diseases of the respiratory system 02/01/2018    History of acute pharyngitis    Personal history of other infectious and parasitic diseases 08/17/2018    History of dermatophytosis    Reaction to severe stress, unspecified 03/14/2016    Situational stress    Shortness of breath 05/08/2018    SOB (shortness of breath) on exertion    Sprain of unspecified ligament of right ankle, initial encounter 03/14/2016    Right ankle sprain    Strain of unspecified muscle, fascia and tendon at shoulder and upper arm level, right arm, initial encounter 04/24/2017    Right shoulder strain    Unspecified acute conjunctivitis, left eye 02/09/2018    Acute bacterial conjunctivitis of left eye       Surgical History  Past Surgical History:   Procedure Laterality Date    OTHER SURGICAL HISTORY  07/31/2020    No history of surgery        Social History  She reports that she has never smoked. She has never used smokeless tobacco. She reports that she does not drink alcohol and does not use drugs.    Family History  Family History   Problem Relation Name Age of Onset    Arthritis Mother          Allergies  Amoxicillin    Review of Systems     Physical Exam     Last Recorded Vitals  There were no vitals taken for this visit.    Relevant Results  {If you would like to pull in Medications, type .meds     If  you would like to pull in Lab results for the last 24 hours, type .[id number]    If you would like to pull in Imaging results, type .imgrslt :99}      ***     Assessment/Plan   {Assess/PlanSmartLinks:33890}    ***       I spent *** minutes in the professional and overall care of this patient.      Rishi Mccartney MD

## 2023-11-09 ENCOUNTER — OFFICE VISIT (OUTPATIENT)
Dept: PRIMARY CARE | Facility: CLINIC | Age: 58
End: 2023-11-09
Payer: COMMERCIAL

## 2023-11-09 VITALS
WEIGHT: 221 LBS | OXYGEN SATURATION: 94 % | SYSTOLIC BLOOD PRESSURE: 152 MMHG | DIASTOLIC BLOOD PRESSURE: 73 MMHG | HEART RATE: 72 BPM | BODY MASS INDEX: 44.64 KG/M2

## 2023-11-09 DIAGNOSIS — Z00.00 HEALTHCARE MAINTENANCE: Primary | ICD-10-CM

## 2023-11-09 DIAGNOSIS — Z12.11 ENCOUNTER FOR SCREENING FOR MALIGNANT NEOPLASM OF COLON: ICD-10-CM

## 2023-11-09 DIAGNOSIS — I10 PRIMARY HYPERTENSION: ICD-10-CM

## 2023-11-09 DIAGNOSIS — E66.01 CLASS 3 SEVERE OBESITY DUE TO EXCESS CALORIES WITH SERIOUS COMORBIDITY AND BODY MASS INDEX (BMI) OF 40.0 TO 44.9 IN ADULT (MULTI): ICD-10-CM

## 2023-11-09 DIAGNOSIS — R73.03 PREDIABETES: ICD-10-CM

## 2023-11-09 PROBLEM — R51.9 ACUTE HEADACHE: Status: RESOLVED | Noted: 2023-05-25 | Resolved: 2023-11-09

## 2023-11-09 PROBLEM — L63.0 ALOPECIA (CAPITIS) TOTALIS: Status: RESOLVED | Noted: 2020-11-13 | Resolved: 2023-11-09

## 2023-11-09 PROBLEM — M25.471 ANKLE EDEMA, BILATERAL: Status: RESOLVED | Noted: 2023-05-25 | Resolved: 2023-11-09

## 2023-11-09 PROBLEM — M25.472 ANKLE EDEMA, BILATERAL: Status: RESOLVED | Noted: 2023-05-25 | Resolved: 2023-11-09

## 2023-11-09 PROCEDURE — 3008F BODY MASS INDEX DOCD: CPT | Performed by: STUDENT IN AN ORGANIZED HEALTH CARE EDUCATION/TRAINING PROGRAM

## 2023-11-09 PROCEDURE — 1036F TOBACCO NON-USER: CPT | Performed by: STUDENT IN AN ORGANIZED HEALTH CARE EDUCATION/TRAINING PROGRAM

## 2023-11-09 PROCEDURE — 99396 PREV VISIT EST AGE 40-64: CPT | Performed by: STUDENT IN AN ORGANIZED HEALTH CARE EDUCATION/TRAINING PROGRAM

## 2023-11-09 PROCEDURE — 3078F DIAST BP <80 MM HG: CPT | Performed by: STUDENT IN AN ORGANIZED HEALTH CARE EDUCATION/TRAINING PROGRAM

## 2023-11-09 PROCEDURE — 3077F SYST BP >= 140 MM HG: CPT | Performed by: STUDENT IN AN ORGANIZED HEALTH CARE EDUCATION/TRAINING PROGRAM

## 2023-11-09 RX ORDER — LISINOPRIL 20 MG/1
20 TABLET ORAL DAILY
Qty: 90 TABLET | Refills: 3 | Status: SHIPPED | OUTPATIENT
Start: 2023-11-09 | End: 2024-11-03

## 2023-11-09 NOTE — PATIENT INSTRUCTIONS
Please stop at the lab ([address]) to complete your blood and/or urine work that I've ordered for you.    I will contact you with the results at my soonest convenience. I strongly urge you to use [employer] as this is the quickest and easiest way to access your results and receive my correspondences.    I have renewed your chronic medications today.    I have ordered Cologuard to screen you for colon cancer. You will receive a kit at home.     I am referring you to our [facility] Care Team to see if you qualify for weight loss medications.     I recommend the following shots: Flu, Shingrix, COVID.    See me yearly for a complete physical exam, and sooner as needed for sick visits

## 2023-11-09 NOTE — PROGRESS NOTES
Subjective   Patient ID: Miri Tavarez is a 57 y.o. female who presents for No chief complaint on file..    HPI     Since last in, had CCY with Dr. Mccartney. Abd pain improved.     Re: obesity - of her medical problems. BMI > 40. Wishes to meet with [facility] team for GLP-1 considerations as she's a prediabetic.      Re: HTN - not at goal. On hydrochlorothiazide 25mg, and only 5mg Amlodipine as she had ankle swelling on 10mg dose. Willing to go on third agent. Reports no sx high or low from HTN; denies blurry vision, HA, dizziness LoC CP SoB Jack and leg swelling      Re: HM - mammogram is current. Cologard due. Declines COVID, flu, and VZV shots.      PMHx, FHx, Social Hx, Surg Hx personally reviewed at this appointment. No pertinent findings and/or changes from prior (if applicable).     ROS: Denies wt gain/loss f/c HA LoC CP SOB NVDC. See HPI above, and scanned sheet (if applicable). All other systems are reviewed and are without complaint.     Review of Systems    Objective   /73   Pulse 72   Wt 100 kg (221 lb)   SpO2 94%   BMI 44.64 kg/m²     Physical Exam  Gen: obese, NAD. AAO x3.  HEENT: NC/AT. Anicteric sclera, symmetric pupils. MMM no thrush.  Neck: Soft, supple. No LAD. No goiter.  CV: RRR nl s1s2 no m/r/g  Pulm: CTAB no w/r/r, good air exchange  GI: obese, soft NTND BS+ no hsm  Ext: WWP no edema  Neuro: II-XII grossly intact, nonfocal systemic findings  MSK: 5/5 strength b/l UE and LE  Gait: unremarkable     Lab Results   Component Value Date    WBC CANCELED 08/16/2023    HGB CANCELED 08/16/2023    HCT CANCELED 08/16/2023    PLT CANCELED 08/16/2023    CHOL 198 08/31/2021    TRIG 124 08/31/2021    HDL 56.9 08/31/2021    ALT CANCELED 08/16/2023    AST CANCELED 08/16/2023    NA CANCELED 08/16/2023    K CANCELED 08/16/2023    CL CANCELED 08/16/2023    CREATININE CANCELED 08/16/2023    BUN CANCELED 08/16/2023    CO2 CANCELED 08/16/2023    HGBA1C CANCELED 08/13/2023     par    FL GI cholangiography  intraop  These images are not reportable by radiology and will not be interpreted   by  Radiologists.         Assessment/Plan      # HTN: not at goal  - ambulatory pressures, RTC 4-8 weeks with BP log  - routine blood/urine work, if not recent  - lifestyle modifications discussed at length  - Add lisinopril 20mg to her regimen; continue CCB 5mg and hydrochlorothiazide 25mg    # Obesity: BMI > 40, PreDM  - counselled on wt loss via diet, exercise, and other lifestyle modifications   - APC referral    # Vit D Def  - renew Vit D  - repeat vit d level     # Health Maintenance  - routine blood work  - Colonoscopy: cologard due  - Mammogram: UTD, repeat at q6 mos (1/24)  - Immunizations: strongly recommend COVID vaccine, flu and VZV shots

## 2023-11-10 ENCOUNTER — APPOINTMENT (OUTPATIENT)
Dept: LAB | Facility: LAB | Age: 58
End: 2023-11-10
Payer: COMMERCIAL

## 2023-11-10 DIAGNOSIS — L63.8 OTHER ALOPECIA AREATA: Primary | ICD-10-CM

## 2023-11-11 ENCOUNTER — LAB (OUTPATIENT)
Dept: LAB | Facility: LAB | Age: 58
End: 2023-11-11
Payer: COMMERCIAL

## 2023-11-11 DIAGNOSIS — E66.01 CLASS 3 SEVERE OBESITY DUE TO EXCESS CALORIES WITH SERIOUS COMORBIDITY AND BODY MASS INDEX (BMI) OF 40.0 TO 44.9 IN ADULT (MULTI): ICD-10-CM

## 2023-11-11 DIAGNOSIS — Z00.00 HEALTHCARE MAINTENANCE: ICD-10-CM

## 2023-11-11 DIAGNOSIS — I10 PRIMARY HYPERTENSION: ICD-10-CM

## 2023-11-11 DIAGNOSIS — R73.03 PREDIABETES: ICD-10-CM

## 2023-11-11 DIAGNOSIS — L63.8 OTHER ALOPECIA AREATA: ICD-10-CM

## 2023-11-11 LAB
ALBUMIN SERPL BCP-MCNC: 3.6 G/DL (ref 3.4–5)
ALP SERPL-CCNC: 54 U/L (ref 33–110)
ALT SERPL W P-5'-P-CCNC: 11 U/L (ref 7–45)
ANION GAP SERPL CALC-SCNC: 13 MMOL/L (ref 10–20)
AST SERPL W P-5'-P-CCNC: 9 U/L (ref 9–39)
BASOPHILS # BLD MANUAL: 0.11 X10*3/UL (ref 0–0.1)
BASOPHILS NFR BLD MANUAL: 0.9 %
BILIRUB SERPL-MCNC: 0.3 MG/DL (ref 0–1.2)
BUN SERPL-MCNC: 17 MG/DL (ref 6–23)
CALCIUM SERPL-MCNC: 9.1 MG/DL (ref 8.6–10.6)
CHLORIDE SERPL-SCNC: 103 MMOL/L (ref 98–107)
CHOLEST SERPL-MCNC: 230 MG/DL (ref 0–199)
CHOLESTEROL/HDL RATIO: 2.6
CO2 SERPL-SCNC: 34 MMOL/L (ref 21–32)
CREAT SERPL-MCNC: 0.72 MG/DL (ref 0.5–1.05)
EOSINOPHIL # BLD MANUAL: 0.11 X10*3/UL (ref 0–0.7)
EOSINOPHIL NFR BLD MANUAL: 0.9 %
ERYTHROCYTE [DISTWIDTH] IN BLOOD BY AUTOMATED COUNT: 12.9 % (ref 11.5–14.5)
EST. AVERAGE GLUCOSE BLD GHB EST-MCNC: 131 MG/DL
FERRITIN SERPL-MCNC: 130 NG/ML (ref 8–150)
FSH SERPL-ACNC: 98.5 IU/L
GFR SERPL CREATININE-BSD FRML MDRD: >90 ML/MIN/1.73M*2
GLUCOSE SERPL-MCNC: 114 MG/DL (ref 74–99)
HBA1C MFR BLD: 6.2 %
HCT VFR BLD AUTO: 38.6 % (ref 36–46)
HDLC SERPL-MCNC: 88 MG/DL
HGB BLD-MCNC: 12.1 G/DL (ref 12–16)
IMM GRANULOCYTES # BLD AUTO: 0.18 X10*3/UL (ref 0–0.7)
IMM GRANULOCYTES NFR BLD AUTO: 1.5 % (ref 0–0.9)
LDLC SERPL CALC-MCNC: 98 MG/DL
LH SERPL-ACNC: 60.5 IU/L
LYMPHOCYTES # BLD MANUAL: 5.22 X10*3/UL (ref 1.2–4.8)
LYMPHOCYTES NFR BLD MANUAL: 43.5 %
MCH RBC QN AUTO: 30.2 PG (ref 26–34)
MCHC RBC AUTO-ENTMCNC: 31.3 G/DL (ref 32–36)
MCV RBC AUTO: 96 FL (ref 80–100)
MONOCYTES # BLD MANUAL: 0.73 X10*3/UL (ref 0.1–1)
MONOCYTES NFR BLD MANUAL: 6.1 %
NEUTROPHILS # BLD MANUAL: 5.83 X10*3/UL (ref 1.2–7.7)
NEUTS BAND # BLD MANUAL: 0.2 X10*3/UL (ref 0–0.7)
NEUTS BAND NFR BLD MANUAL: 1.7 %
NEUTS SEG # BLD MANUAL: 5.63 X10*3/UL (ref 1.2–7)
NEUTS SEG NFR BLD MANUAL: 46.9 %
NON HDL CHOLESTEROL: 142 MG/DL (ref 0–149)
NRBC BLD-RTO: 0 /100 WBCS (ref 0–0)
OVALOCYTES BLD QL SMEAR: ABNORMAL
PLATELET # BLD AUTO: 378 X10*3/UL (ref 150–450)
POLYCHROMASIA BLD QL SMEAR: ABNORMAL
POTASSIUM SERPL-SCNC: 3.7 MMOL/L (ref 3.5–5.3)
PROT SERPL-MCNC: 6.5 G/DL (ref 6.4–8.2)
RBC # BLD AUTO: 4.01 X10*6/UL (ref 4–5.2)
RBC MORPH BLD: ABNORMAL
SODIUM SERPL-SCNC: 146 MMOL/L (ref 136–145)
TOTAL CELLS COUNTED BLD: 115
TRIGL SERPL-MCNC: 218 MG/DL (ref 0–149)
TSH SERPL-ACNC: 2.13 MIU/L (ref 0.44–3.98)
VLDL: 44 MG/DL (ref 0–40)
WBC # BLD AUTO: 12 X10*3/UL (ref 4.4–11.3)

## 2023-11-11 PROCEDURE — 82728 ASSAY OF FERRITIN: CPT

## 2023-11-11 PROCEDURE — 84443 ASSAY THYROID STIM HORMONE: CPT

## 2023-11-11 PROCEDURE — 85027 COMPLETE CBC AUTOMATED: CPT

## 2023-11-11 PROCEDURE — 84402 ASSAY OF FREE TESTOSTERONE: CPT

## 2023-11-11 PROCEDURE — 86225 DNA ANTIBODY NATIVE: CPT

## 2023-11-11 PROCEDURE — 83036 HEMOGLOBIN GLYCOSYLATED A1C: CPT

## 2023-11-11 PROCEDURE — 83002 ASSAY OF GONADOTROPIN (LH): CPT

## 2023-11-11 PROCEDURE — 86038 ANTINUCLEAR ANTIBODIES: CPT

## 2023-11-11 PROCEDURE — 83001 ASSAY OF GONADOTROPIN (FSH): CPT

## 2023-11-11 PROCEDURE — 85007 BL SMEAR W/DIFF WBC COUNT: CPT

## 2023-11-11 PROCEDURE — 86235 NUCLEAR ANTIGEN ANTIBODY: CPT

## 2023-11-11 PROCEDURE — 80061 LIPID PANEL: CPT

## 2023-11-11 PROCEDURE — 80053 COMPREHEN METABOLIC PANEL: CPT

## 2023-11-11 PROCEDURE — 36415 COLL VENOUS BLD VENIPUNCTURE: CPT

## 2023-11-13 ENCOUNTER — TELEMEDICINE (OUTPATIENT)
Dept: PHARMACY | Facility: HOSPITAL | Age: 58
End: 2023-11-13
Payer: COMMERCIAL

## 2023-11-13 ENCOUNTER — PHARMACY VISIT (OUTPATIENT)
Dept: PHARMACY | Facility: CLINIC | Age: 58
End: 2023-11-13
Payer: MEDICAID

## 2023-11-13 DIAGNOSIS — R73.03 PREDIABETES: ICD-10-CM

## 2023-11-13 DIAGNOSIS — E66.01 CLASS 3 SEVERE OBESITY DUE TO EXCESS CALORIES WITH SERIOUS COMORBIDITY AND BODY MASS INDEX (BMI) OF 40.0 TO 44.9 IN ADULT (MULTI): ICD-10-CM

## 2023-11-13 PROCEDURE — RXMED WILLOW AMBULATORY MEDICATION CHARGE

## 2023-11-13 RX ORDER — DULAGLUTIDE 0.75 MG/.5ML
0.75 INJECTION, SOLUTION SUBCUTANEOUS
Qty: 2 ML | Refills: 1 | Status: SHIPPED | OUTPATIENT
Start: 2023-11-13 | End: 2023-11-29 | Stop reason: ALTCHOICE

## 2023-11-13 NOTE — PROGRESS NOTES
Pharmacist Clinic: Weight Management    Miri Tavarez was referred to the Clinical Pharmacy Team for weight management.     Referring Provider: Manolo Tinoco MD    HISTORY OF PRESENT ILLNESS    - Patient has Medicaid; only covers Trulicity or Victoza without PA's; does not cover weight loss medications    Diet:  - 2 meals/day  - Snacks a lot throughout the day (candy, chips)  - Bfast: Turkey robles and boiled eggs; omelet   - Dinner: salad  - Drinks: water; powerade/gatorade     Exercise Routine:   - Going to gym again starting tomorrow    Weight loss: none    PHARMACY ASSESSMENT    CURRENT WEIGHT LOSS PHARMACOTHERAPY  - none yet     No issues reported in regards to [accessibility, affordability, adherence, adverse effects, or organization]    DRUG INTERACTIONS  - No significant drug-drug interactions exist that require adjustment to therapy    Allergies: Amoxicillin     [id number] - [place], [place] - [address]  [address] [facility] [address]  Phone: [phone] Fax: [phone]    [facility]/pharmacy #[id number] - [place], [place] - [address] [address] AT [facility]  [address] [place] [address]  Phone: [phone] Fax: [phone]    [facility] - ESTRELLA Patterson - [facility] AT Portal to Registered [facility] Sites  [facility]  [place] [address]  Phone: [phone] Fax: [phone]     [facility]  [address] [place] [address]  Phone: [phone] Fax: [phone]      LAB  Lab Results   Component Value Date    BILITOT 0.3 11/11/2023    CALCIUM 9.1 11/11/2023    CO2 34 (H) 11/11/2023     11/11/2023    CREATININE 0.72 11/11/2023    GLUCOSE 114 (H) 11/11/2023    ALKPHOS 54 11/11/2023    K 3.7 11/11/2023    PROT 6.5 11/11/2023     (H) 11/11/2023    AST 9 11/11/2023    ALT 11 11/11/2023    BUN 17 11/11/2023    ANIONGAP 13 11/11/2023    ALBUMIN 3.6 11/11/2023     EGFR >90 11/11/2023     Lab Results   Component Value Date    TRIG 218 (H) 11/11/2023    CHOL 230 (H) 11/11/2023    LDLCALC 98 11/11/2023    HDL 88.0 11/11/2023     Lab Results   Component Value Date    HGBA1C 6.2 (H) 11/11/2023       Current Outpatient Medications on File Prior to Visit   Medication Sig Dispense Refill    amLODIPine (Norvasc) 5 mg tablet TAKE ONE TABLET BY MOUTH DAILY AS DIRECTED 90 tablet 0    cholecalciferol (Vitamin D-3) 1,250 mcg (50,000 unit) capsule Take 1 capsule (50,000 Units) by mouth.      clindamycin (Cleocin T) 1 % gel Apply topically once daily in the morning. Apply to full face.      hydroCHLOROthiazide (HYDRODiuril) 25 mg tablet TAKE ONE TABLET BY MOUTH DAILY AS DIRECTED 90 tablet 0    ibuprofen 600 mg tablet Take 1 tablet (600 mg) by mouth every 6 hours if needed for moderate pain (4 - 6) for up to 20 doses. 20 tablet 0    lisinopril 20 mg tablet Take 1 tablet (20 mg) by mouth once daily. 90 tablet 3    [DISCONTINUED] amLODIPine (Norvasc) 10 mg tablet Take 1 tablet (10 mg) by mouth once daily.      [DISCONTINUED] Arthritis Pain Relief, acetam, 650 mg ER tablet       [DISCONTINUED] betamethasone dipropionate (Diprosone) 0.05 % ointment Apply 1 Application topically.      [DISCONTINUED] bimatoprost (Latisse) 0.03 % ophthalmic solution Administer 1 Application into affected eye(s).      [DISCONTINUED] ciclopirox (Loprox) 0.77 % cream Apply topically 2 times a day. APPLY TO FULL FACE AND EARS      [DISCONTINUED] cyclobenzaprine (Flexeril) 10 mg tablet Take by mouth.      [DISCONTINUED] desonide (DesOwen) 0.05 % ointment APPLY TO FACE TWICE DAILY MONDAY THROUGH FRIDAY      [DISCONTINUED] diclofenac sodium (Voltaren) 1 % gel gel APPLY 1 GRAM TO AFFECTED AREA TWICE DAILY      [DISCONTINUED] doxycycline (Vibramycin) 100 mg capsule TAKE ONE CAPSULE BY MOUTH TWO TIMES A DAY WITH A FULL MEAL      [DISCONTINUED] fluconazole (Diflucan) 150 mg tablet TAKE 2 TABLETS BY MOUTH EVERY THURSDAY FOR  4 WEEKS      [DISCONTINUED] fluocinolone 0.01 % cream apply to affected areas twice a day.      [DISCONTINUED] hydrocortisone 2.5 % cream APPLY TOPICALLY TO FACE TWICE A DAY      [DISCONTINUED] hydroquinone 4 % cream APPLY TO AFFECTED AREAS ON SKIN TWICE DAILY. USE SUNSCREEN.      [DISCONTINUED]  mg tablet TAKE ONE TABLET BY MOUTH TWO TIMES A DAY WITH MEALS AS NEEDED FOR KNEE PAIN      [DISCONTINUED] ketoconazole (NIZOral) 2 % cream Apply to full face twice daily      [DISCONTINUED] ketoconazole (NIZOral) 2 % shampoo Apply topically.      [DISCONTINUED] meloxicam (Mobic) 15 mg tablet Take 1 tablet (15 mg) by mouth once daily.      [DISCONTINUED] mometasone (Elocon) 0.1 % cream APPLY TO AFFECTED AREAS OF FACE  SCALP AND EARS UP TO TWICE DAILY MONDAY THROUGH FRIDAY      [DISCONTINUED] oxyCODONE (Roxicodone) 5 mg immediate release tablet Take 1 tablet (5 mg) by mouth every 6 hours if needed for severe pain (7 - 10) for up to 12 doses. (Patient not taking: Reported on 10/24/2023) 12 tablet 0    [DISCONTINUED] polyethylene glycol (Glycolax, Miralax) 17 gram packet Take 17 g by mouth once daily as needed (for constipation) for up to 10 doses. (Patient not taking: Reported on 10/24/2023) 10 packet 0    [DISCONTINUED] predniSONE (Deltasone) 20 mg tablet Take 2 tablets (40 mg) by mouth once daily in the morning. Take for 14 days      [DISCONTINUED] triamcinolone (Kenalog) 0.1 % ointment APPLY TO SCALP ONCE PER DAY MONDAY THROUGH FRIDAY       No current facility-administered medications on file prior to visit.     PATIENT EDUCATION/GOALS  - Target goal 5% to 10% weight loss within 3-6 months  - Counseled patient on MOA, expectations, side effects, duration of therapy, contraindications, administration, and monitoring parameters  - Answered all patient questions and concerns; provided [facility] phone number if issues/questions arise    RECOMMENDATIONS/PLAN  Problem List Items Addressed This Visit       Prediabetes    Obesity      START Trulicity 0.75 mg weekly. Patient is aware this is being used off label for weight loss. Medicaid only covers trulicity/victoza without a PA.  Trulicity Education:  - Counseled patient on Trulicity MOA, expectations, side effects, duration of therapy, administration, and monitoring parameters.  - Provided detailed dosing and administration counseling to ensure proper technique.   - Reviewed Trulicity titration schedule, starting with 0.75 mg once weekly to 1.5 mg, 3 mg, and if tolerated 4.5 mg.  - Counseled patient on the benefits of GLP-1ra, such as cardiovascular risk reduction, glycemic control, and weight loss potential.  - Reviewed storage requirements of Trulicity when not in use, and when to administer the medication if a dose is missed.  - Advised patient that they may experience improved satiety after meals and portion sizes of meals may be reduced as doses of Trulicity increase.   - Recommend patient reduce carb intake, and increase proteins and vegetables. She may feel better if she eats several small meals throughout the day vs 2-3 larger meals. Also try to avoid greasy, fatty, spicy or any other irritating foods especially when starting the medication.         Relevant Medications    dulaglutide (Trulicity) 0.75 mg/0.5 mL pen injector    Other Relevant Orders    Follow Up In [facility] - Pharmacy     Clinical Pharmacist follow up: 11/29 1pm for tolerability    Continue all meds under the continuation of care with the referring provider and clinical pharmacy team.    Thank you,  Tiffanie Hopkins, PharmD     Verbal consent to manage patient's drug therapy was obtained from patient. They were informed they may decline to participate or withdraw from participation in pharmacy services at any time.

## 2023-11-14 LAB
ANA PATTERN: ABNORMAL
ANA SER QL HEP2 SUBST: POSITIVE
ANA TITR SER IF: ABNORMAL {TITER}
CENTROMERE B AB SER-ACNC: <0.2 AI
CHROMATIN AB SERPL-ACNC: <0.2 AI
DSDNA AB SER-ACNC: <1 IU/ML
ENA JO1 AB SER QL IA: <0.2 AI
ENA RNP AB SER IA-ACNC: 0.5 AI
ENA SCL70 AB SER QL IA: <0.2 AI
ENA SM AB SER IA-ACNC: <0.2 AI
ENA SM+RNP AB SER QL IA: <0.2 AI
ENA SS-A AB SER IA-ACNC: <0.2 AI
ENA SS-B AB SER IA-ACNC: <0.2 AI
RIBOSOMAL P AB SER-ACNC: <0.2 AI

## 2023-11-16 LAB
TESTOSTERONE FREE (CHAN): 1.4 PG/ML (ref 0.1–6.4)
TESTOSTERONE,TOTAL,LC-MS/MS: 7 NG/DL (ref 2–45)

## 2023-11-29 ENCOUNTER — PHARMACY VISIT (OUTPATIENT)
Dept: PHARMACY | Facility: CLINIC | Age: 58
End: 2023-11-29
Payer: MEDICAID

## 2023-11-29 ENCOUNTER — TELEMEDICINE (OUTPATIENT)
Dept: PHARMACY | Facility: HOSPITAL | Age: 58
End: 2023-11-29
Payer: COMMERCIAL

## 2023-11-29 DIAGNOSIS — E66.01 CLASS 3 SEVERE OBESITY DUE TO EXCESS CALORIES WITH SERIOUS COMORBIDITY AND BODY MASS INDEX (BMI) OF 40.0 TO 44.9 IN ADULT (MULTI): ICD-10-CM

## 2023-11-29 PROCEDURE — RXMED WILLOW AMBULATORY MEDICATION CHARGE

## 2023-11-29 RX ORDER — DULAGLUTIDE 1.5 MG/.5ML
1.5 INJECTION, SOLUTION SUBCUTANEOUS
Qty: 2 ML | Refills: 1 | Status: SHIPPED | OUTPATIENT
Start: 2023-11-29 | End: 2024-01-24 | Stop reason: SDUPTHER

## 2023-11-29 NOTE — PROGRESS NOTES
Pharmacist Clinic: Weight Management    Miri Tavarez was referred to the Clinical Pharmacy Team for weight management.     Referring Provider: Manolo Tinoco MD    HISTORY OF PRESENT ILLNESS    - No ADR's so far with Trulicity; definitely feeling appetite reduction  - Has had 3 doses so far    Diet:  - Appetite lowered  - 2 meals/day  - Snacks a lot throughout the day (candy, chips)  - Bfast: Turkey robles and boiled eggs; omelet   - Dinner: salad  - Drinks: water; powerade/gatorade     Exercise Routine:   - Going to start going to gym (hasn't gone since initial visit)    Weight loss: hasn't monitored yet    PHARMACY ASSESSMENT    CURRENT WEIGHT LOSS PHARMACOTHERAPY  - Trulicity 0.75 mg weekly Sundays    No issues reported in regards to [accessibility, affordability, adherence, adverse effects, or organization]    DRUG INTERACTIONS  - No significant drug-drug interactions exist that require adjustment to therapy    Allergies: Amoxicillin     [id number] - [place], [place] - [address]  [address] [facility] [address]  Phone: [phone] Fax: [phone]    [facility]/pharmacy #[id number] - [place], [place] - [address] AT CORNER OF [facility]  [address] [address]  Phone: [phone] Fax: [phone]    [facility] - ESTRELLA Patterson - [facility] AT Portal to Registered [facility] Sites  [facility]  [place] [address]  Phone: [phone] Fax: [phone]    [facility]  [address] [facility] [address] [place] [address]  Phone: [phone] Fax: [phone]      LAB  Lab Results   Component Value Date    BILITOT 0.3 11/11/2023    CALCIUM 9.1 11/11/2023    CO2 34 (H) 11/11/2023     11/11/2023    CREATININE 0.72 11/11/2023    GLUCOSE 114 (H) 11/11/2023    ALKPHOS 54 11/11/2023    K 3.7 11/11/2023    PROT 6.5 11/11/2023     (H) 11/11/2023    AST 9 11/11/2023    ALT 11 11/11/2023     BUN 17 11/11/2023    ANIONGAP 13 11/11/2023    ALBUMIN 3.6 11/11/2023    EGFR >90 11/11/2023     Lab Results   Component Value Date    TRIG 218 (H) 11/11/2023    CHOL 230 (H) 11/11/2023    LDLCALC 98 11/11/2023    HDL 88.0 11/11/2023     Lab Results   Component Value Date    HGBA1C 6.2 (H) 11/11/2023       Current Outpatient Medications on File Prior to Visit   Medication Sig Dispense Refill    amLODIPine (Norvasc) 5 mg tablet TAKE ONE TABLET BY MOUTH DAILY AS DIRECTED 90 tablet 0    cholecalciferol (Vitamin D-3) 1,250 mcg (50,000 unit) capsule Take 1 capsule (50,000 Units) by mouth.      clindamycin (Cleocin T) 1 % gel Apply topically once daily in the morning. Apply to full face.      hydroCHLOROthiazide (HYDRODiuril) 25 mg tablet TAKE ONE TABLET BY MOUTH DAILY AS DIRECTED 90 tablet 0    ibuprofen 600 mg tablet Take 1 tablet (600 mg) by mouth every 6 hours if needed for moderate pain (4 - 6) for up to 20 doses. 20 tablet 0    lisinopril 20 mg tablet Take 1 tablet (20 mg) by mouth once daily. 90 tablet 3    [DISCONTINUED] dulaglutide (Trulicity) 0.75 mg/0.5 mL pen injector Inject 0.75 mg under the skin 1 (one) time per week. 2 mL 1     No current facility-administered medications on file prior to visit.     PATIENT EDUCATION/GOALS  - Target goal 5% to 10% weight loss within 3-6 months  - Counseled patient on MOA, expectations, side effects, duration of therapy, contraindications, administration, and monitoring parameters  - Answered all patient questions and concerns; provided [facility] phone number if issues/questions arise    RECOMMENDATIONS/PLAN  Problem List Items Addressed This Visit       Obesity     INCREASE Trulicity to 1.5 mg weekly.  Encouraged patient to continue listening to her body when she feels full to stop eating to avoid stomach upset.  Recommend she start going to gym again with a goal of 30 mins exercise 3-5 days per week to help maximize her weight loss potential.         Relevant Medications     dulaglutide (Trulicity) 1.5 mg/0.5 mL pen injector injection    Other Relevant Orders    Follow Up In Advanced Primary Care - Pharmacy     Clinical Pharmacist follow up: 12/27 1pm    Continue all meds under the continuation of care with the referring provider and clinical pharmacy team.    Thank you,  Tiffanie Hopkins, PharmD     Verbal consent to manage patient's drug therapy was obtained from patient. They were informed they may decline to participate or withdraw from participation in pharmacy services at any time.

## 2023-11-29 NOTE — ASSESSMENT & PLAN NOTE
INCREASE Trulicity to 1.5 mg weekly.  Encouraged patient to continue listening to her body when she feels full to stop eating to avoid stomach upset.  Recommend she start going to gym again with a goal of 30 mins exercise 3-5 days per week to help maximize her weight loss potential.

## 2023-12-22 PROCEDURE — RXMED WILLOW AMBULATORY MEDICATION CHARGE

## 2023-12-27 ENCOUNTER — TELEMEDICINE (OUTPATIENT)
Dept: PHARMACY | Facility: HOSPITAL | Age: 58
End: 2023-12-27
Payer: COMMERCIAL

## 2023-12-27 ENCOUNTER — PHARMACY VISIT (OUTPATIENT)
Dept: PHARMACY | Facility: CLINIC | Age: 58
End: 2023-12-27
Payer: MEDICAID

## 2023-12-27 DIAGNOSIS — E66.01 CLASS 3 SEVERE OBESITY DUE TO EXCESS CALORIES WITH SERIOUS COMORBIDITY AND BODY MASS INDEX (BMI) OF 40.0 TO 44.9 IN ADULT (MULTI): ICD-10-CM

## 2023-12-27 NOTE — PROGRESS NOTES
Pharmacist Clinic: Weight Management    Miri Tavarez was referred to the Clinical Pharmacy Team for weight management.     Referring Provider: Manolo Tinoco MD    HISTORY OF PRESENT ILLNESS    - No ADR's so far with Trulicity; definitely feeling appetite reduction  - Has had 3 doses so far of 1.5 mg  - Picked up refill from [facility] so she will have to continue 1.5 mg dose another month    Diet:  - Appetite lowered still  - 2 meals/day  - Snacks a lot throughout the day (candy, chips)  - Bfast: Turkey robles and boiled eggs; omelet   - Dinner: salad  - Drinks: water; powerade/gatorade     Exercise Routine:   - Going to start going to gym (hasn't gone since initial visit)    Weight loss: hasn't monitored yet    PHARMACY ASSESSMENT    CURRENT WEIGHT LOSS PHARMACOTHERAPY  - Trulicity 1.5 mg weekly Sundays    No issues reported in regards to [accessibility, affordability, adherence, adverse effects, or organization]    DRUG INTERACTIONS  - No significant drug-drug interactions exist that require adjustment to therapy    Allergies: Amoxicillin     [id number] - [place], [place] - [address]  [address] [facility] [address]  Phone: [phone] Fax: [phone]    [facility]/pharmacy #[id number] - [place], [place] - [address] AT CORNER OF [address] [address]  Phone: [phone] Fax: [phone]    [facility] ESTRELLA Patterson - [facility] AT Portal to Registered [facility] Sites  [facility]  [place] [address]  Phone: [phone] Fax: [phone]     [facility]  [address]  [place] [id number]  Phone: [phone] Fax: [phone]      LAB  Lab Results   Component Value Date    BILITOT 0.3 11/11/2023    CALCIUM 9.1 11/11/2023    CO2 34 (H) 11/11/2023     11/11/2023    CREATININE 0.72 11/11/2023    GLUCOSE 114 (H) 11/11/2023    ALKPHOS 54 11/11/2023    K 3.7  11/11/2023    PROT 6.5 11/11/2023     (H) 11/11/2023    AST 9 11/11/2023    ALT 11 11/11/2023    BUN 17 11/11/2023    ANIONGAP 13 11/11/2023    ALBUMIN 3.6 11/11/2023    EGFR >90 11/11/2023     Lab Results   Component Value Date    TRIG 218 (H) 11/11/2023    CHOL 230 (H) 11/11/2023    LDLCALC 98 11/11/2023    HDL 88.0 11/11/2023     Lab Results   Component Value Date    HGBA1C 6.2 (H) 11/11/2023       Current Outpatient Medications on File Prior to Visit   Medication Sig Dispense Refill    amLODIPine (Norvasc) 5 mg tablet TAKE ONE TABLET BY MOUTH DAILY AS DIRECTED 90 tablet 0    cholecalciferol (Vitamin D-3) 1,250 mcg (50,000 unit) capsule Take 1 capsule (50,000 Units) by mouth.      clindamycin (Cleocin T) 1 % gel Apply topically once daily in the morning. Apply to full face.      dulaglutide (Trulicity) 1.5 mg/0.5 mL pen injector injection Inject 1.5 mg under the skin 1 (one) time per week. 2 mL 1    hydroCHLOROthiazide (HYDRODiuril) 25 mg tablet TAKE ONE TABLET BY MOUTH DAILY AS DIRECTED 90 tablet 0    ibuprofen 600 mg tablet Take 1 tablet (600 mg) by mouth every 6 hours if needed for moderate pain (4 - 6) for up to 20 doses. 20 tablet 0    lisinopril 20 mg tablet Take 1 tablet (20 mg) by mouth once daily. 90 tablet 3     No current facility-administered medications on file prior to visit.     PATIENT EDUCATION/GOALS  - Target goal 5% to 10% weight loss within 3-6 months  - Counseled patient on MOA, expectations, side effects, duration of therapy, contraindications, administration, and monitoring parameters  - Answered all patient questions and concerns; provided [facility] phone number if issues/questions arise    RECOMMENDATIONS/PLAN  Problem List Items Addressed This Visit       Obesity     CONTINUE Trulicity 1.5 mg weekly.  Encouraged patient to continue listening to her body when she feels full to stop eating to avoid stomach upset.  Recommend she start going to gym again with a goal of 30 mins exercise 3-5  days per week to help maximize her weight loss potential.         Relevant Orders    Follow Up In Advanced Primary Care - Pharmacy     Clinical Pharmacist follow up: 1/24 1pm    Continue all meds under the continuation of care with the referring provider and clinical pharmacy team.    Thank you,  Tiffanie Hopkins, PharmD     Verbal consent to manage patient's drug therapy was obtained from patient. They were informed they may decline to participate or withdraw from participation in pharmacy services at any time.

## 2023-12-27 NOTE — ASSESSMENT & PLAN NOTE
CONTINUE Trulicity 1.5 mg weekly.  Encouraged patient to continue listening to her body when she feels full to stop eating to avoid stomach upset.  Recommend she start going to gym again with a goal of 30 mins exercise 3-5 days per week to help maximize her weight loss potential.

## 2024-01-05 ENCOUNTER — ANCILLARY PROCEDURE (OUTPATIENT)
Dept: RADIOLOGY | Facility: CLINIC | Age: 59
End: 2024-01-05
Payer: COMMERCIAL

## 2024-01-05 DIAGNOSIS — R92.8 OTHER ABNORMAL AND INCONCLUSIVE FINDINGS ON DIAGNOSTIC IMAGING OF BREAST: ICD-10-CM

## 2024-01-05 PROCEDURE — 76642 ULTRASOUND BREAST LIMITED: CPT | Mod: RT

## 2024-01-05 PROCEDURE — 76982 USE 1ST TARGET LESION: CPT | Mod: RT

## 2024-01-11 DIAGNOSIS — I10 HYPERTENSION, UNSPECIFIED TYPE: ICD-10-CM

## 2024-01-12 RX ORDER — HYDROCHLOROTHIAZIDE 25 MG/1
25 TABLET ORAL DAILY
Qty: 90 TABLET | Refills: 0 | Status: SHIPPED | OUTPATIENT
Start: 2024-01-12 | End: 2024-05-14 | Stop reason: SDUPTHER

## 2024-01-12 RX ORDER — AMLODIPINE BESYLATE 5 MG/1
5 TABLET ORAL DAILY
Qty: 90 TABLET | Refills: 0 | Status: SHIPPED | OUTPATIENT
Start: 2024-01-12 | End: 2024-05-14 | Stop reason: SDUPTHER

## 2024-01-24 ENCOUNTER — TELEMEDICINE (OUTPATIENT)
Dept: PHARMACY | Facility: HOSPITAL | Age: 59
End: 2024-01-24
Payer: COMMERCIAL

## 2024-01-24 DIAGNOSIS — E66.01 CLASS 3 SEVERE OBESITY DUE TO EXCESS CALORIES WITH SERIOUS COMORBIDITY AND BODY MASS INDEX (BMI) OF 40.0 TO 44.9 IN ADULT (MULTI): ICD-10-CM

## 2024-01-24 PROCEDURE — RXMED WILLOW AMBULATORY MEDICATION CHARGE

## 2024-01-24 RX ORDER — DULAGLUTIDE 1.5 MG/.5ML
1.5 INJECTION, SOLUTION SUBCUTANEOUS
Qty: 2 ML | Refills: 0 | Status: SHIPPED | OUTPATIENT
Start: 2024-01-24 | End: 2024-02-21 | Stop reason: ALTCHOICE

## 2024-01-24 NOTE — PROGRESS NOTES
Pharmacist Clinic: Weight Management    Miri Tavarez was referred to the Clinical Pharmacy Team for weight management.     Referring Provider: Manolo Tinoco MD    HISTORY OF PRESENT ILLNESS    - No ADR's so far with Trulicity; definitely feeling appetite reduction  - Nervous about losing too much weight and wants to do slower titration    Diet:  - Appetite lowered still  - 2 meals/day  - Snacks a lot throughout the day (candy, chips)  - Bfast: Turkey robles and boiled eggs; omelet   - Dinner: salad  - Drinks: water; powerade/gatorade     Exercise Routine:   - Has started going to the gym    Weight loss: 5 lbs     PHARMACY ASSESSMENT    CURRENT WEIGHT LOSS PHARMACOTHERAPY  - Trulicity 1.5 mg weekly Sundays    No issues reported in regards to [accessibility, affordability, adherence, adverse effects, or organization]    DRUG INTERACTIONS  - No significant drug-drug interactions exist that require adjustment to therapy    Allergies: Amoxicillin     [id number] - [place], [place] - [address]  [address] [facility] [address]  Phone: [phone] Fax: [phone]    [facility]/pharmacy #[id number] - [place], [place] - [address] [address] AT [facility]  [address] [facility] [address]  Phone: [phone] Fax: [phone]    [facility] ESTRELLA Patterson - [facility] AT Portal to Registered [facility] Sites  [address] [facility]  [place] [address]  Phone: [phone] Fax: [phone]    [facility]  [address] [place] [address]  Phone: [phone] Fax: [phone]      LAB  Lab Results   Component Value Date    BILITOT 0.3 11/11/2023    CALCIUM 9.1 11/11/2023    CO2 34 (H) 11/11/2023     11/11/2023    CREATININE 0.72 11/11/2023    GLUCOSE 114 (H) 11/11/2023    ALKPHOS 54 11/11/2023    K 3.7 11/11/2023    PROT 6.5 11/11/2023     (H) 11/11/2023    AST 9 11/11/2023    ALT 11  11/11/2023    BUN 17 11/11/2023    ANIONGAP 13 11/11/2023    ALBUMIN 3.6 11/11/2023    EGFR >90 11/11/2023     Lab Results   Component Value Date    TRIG 218 (H) 11/11/2023    CHOL 230 (H) 11/11/2023    LDLCALC 98 11/11/2023    HDL 88.0 11/11/2023     Lab Results   Component Value Date    HGBA1C 6.2 (H) 11/11/2023       Current Outpatient Medications on File Prior to Visit   Medication Sig Dispense Refill    amLODIPine (Norvasc) 5 mg tablet TAKE ONE TABLET BY MOUTH DAILY AS DIRECTED 90 tablet 0    cholecalciferol (Vitamin D-3) 1,250 mcg (50,000 unit) capsule Take 1 capsule (50,000 Units) by mouth.      clindamycin (Cleocin T) 1 % gel Apply topically once daily in the morning. Apply to full face.      hydroCHLOROthiazide (HYDRODiuril) 25 mg tablet TAKE ONE TABLET BY MOUTH DAILY AS DIRECTED 90 tablet 0    ibuprofen 600 mg tablet Take 1 tablet (600 mg) by mouth every 6 hours if needed for moderate pain (4 - 6) for up to 20 doses. 20 tablet 0    lisinopril 20 mg tablet Take 1 tablet (20 mg) by mouth once daily. 90 tablet 3    [DISCONTINUED] dulaglutide (Trulicity) 1.5 mg/0.5 mL pen injector injection Inject 1.5 mg under the skin 1 (one) time per week. 2 mL 1     No current facility-administered medications on file prior to visit.     PATIENT EDUCATION/GOALS  - Target goal 5% to 10% weight loss within 3-6 months  - Counseled patient on MOA, expectations, side effects, duration of therapy, contraindications, administration, and monitoring parameters  - Answered all patient questions and concerns; provided [facility] phone number if issues/questions arise    RECOMMENDATIONS/PLAN  Problem List Items Addressed This Visit       Obesity     CONTINUE Trulicity 1.5 mg weekly.  Patient wishes to stay at this dose for another month.  Encouraged patient to continue listening to her body when she feels full to stop eating to avoid stomach upset.  Recommend she start going to gym again with a goal of 30 mins exercise 3-5 days per week to  help maximize her weight loss potential.         Relevant Medications    dulaglutide (Trulicity) 1.5 mg/0.5 mL pen injector injection    Other Relevant Orders    Follow Up In Advanced Primary Care - Pharmacy     Clinical Pharmacist follow up: 2/21 1pm    Continue all meds under the continuation of care with the referring provider and clinical pharmacy team.    Thank you,  Tiffanie Lynch, PharmD     Verbal consent to manage patient's drug therapy was obtained from patient. They were informed they may decline to participate or withdraw from participation in pharmacy services at any time.

## 2024-01-29 ENCOUNTER — PHARMACY VISIT (OUTPATIENT)
Dept: PHARMACY | Facility: CLINIC | Age: 59
End: 2024-01-29
Payer: MEDICAID

## 2024-02-21 ENCOUNTER — TELEMEDICINE (OUTPATIENT)
Dept: PHARMACY | Facility: HOSPITAL | Age: 59
End: 2024-02-21
Payer: COMMERCIAL

## 2024-02-21 DIAGNOSIS — E66.01 CLASS 3 SEVERE OBESITY DUE TO EXCESS CALORIES WITH SERIOUS COMORBIDITY AND BODY MASS INDEX (BMI) OF 40.0 TO 44.9 IN ADULT (MULTI): ICD-10-CM

## 2024-02-21 RX ORDER — DULAGLUTIDE 3 MG/.5ML
3 INJECTION, SOLUTION SUBCUTANEOUS
Qty: 2 ML | Refills: 1 | Status: SHIPPED | OUTPATIENT
Start: 2024-02-21 | End: 2024-04-17 | Stop reason: SDUPTHER

## 2024-02-21 NOTE — ASSESSMENT & PLAN NOTE
INCREASE Trulicity to 3 mg weekly.  Encouraged patient to continue listening to her body when she feels full to stop eating to avoid stomach upset.  Recommend she start going to gym again with a goal of 30 mins exercise 3-5 days per week to help maximize her weight loss potential.

## 2024-02-21 NOTE — PROGRESS NOTES
Pharmacist Clinic: Weight Management    Miri Tavarez was referred to the Clinical Pharmacy Team for weight management.     Referring Provider: Manolo Tinoco MD    HISTORY OF PRESENT ILLNESS    - No ADR's so far with Trulicity; definitely feeling appetite reduction  - Nervous about losing too much weight and wants to do slower titration    Diet:  - Appetite lowered still  - 2 meals/day  - Snacks a lot throughout the day (candy, chips)  - Bfast: Turkey robles and boiled eggs; omelet   - Dinner: salad  - Drinks: water; powerade/gatorade     Exercise Routine:   - Has started going to the gym    Weight loss: 7 lbs     PHARMACY ASSESSMENT    CURRENT WEIGHT LOSS PHARMACOTHERAPY  - Trulicity 1.5 mg weekly Sundays    No issues reported in regards to [accessibility, affordability, adherence, adverse effects, or organization]    DRUG INTERACTIONS  - No significant drug-drug interactions exist that require adjustment to therapy    Allergies: Amoxicillin     [id number] - [place], [place] - [address]  [address] [facility] [address]  Phone: [phone] Fax: [phone]    [facility]/pharmacy #[id number] - [place], [place] - [address] [facility] [address] [facility]  [address] [facility] [address]  Phone: [phone] Fax: [phone]    [facility]LLA [facility] - [facility] AT Portal to Registered [facility] Sites  [address] [facility]  [place] [address]  Phone: [phone] Fax: [phone]    [facility]  [address] [place] [address]  Phone: [phone] Fax: [phone]    LAB  Lab Results   Component Value Date    BILITOT 0.3 11/11/2023    CALCIUM 9.1 11/11/2023    CO2 34 (H) 11/11/2023     11/11/2023    CREATININE 0.72 11/11/2023    GLUCOSE 114 (H) 11/11/2023    ALKPHOS 54 11/11/2023    K 3.7 11/11/2023    PROT 6.5 11/11/2023     (H) 11/11/2023    AST 9 11/11/2023    ALT 11  11/11/2023    BUN 17 11/11/2023    ANIONGAP 13 11/11/2023    ALBUMIN 3.6 11/11/2023    EGFR >90 11/11/2023     Lab Results   Component Value Date    TRIG 218 (H) 11/11/2023    CHOL 230 (H) 11/11/2023    LDLCALC 98 11/11/2023    HDL 88.0 11/11/2023     Lab Results   Component Value Date    HGBA1C 6.2 (H) 11/11/2023       Current Outpatient Medications on File Prior to Visit   Medication Sig Dispense Refill    amLODIPine (Norvasc) 5 mg tablet TAKE ONE TABLET BY MOUTH DAILY AS DIRECTED 90 tablet 0    cholecalciferol (Vitamin D-3) 1,250 mcg (50,000 unit) capsule Take 1 capsule (50,000 Units) by mouth.      clindamycin (Cleocin T) 1 % gel Apply topically once daily in the morning. Apply to full face.      hydroCHLOROthiazide (HYDRODiuril) 25 mg tablet TAKE ONE TABLET BY MOUTH DAILY AS DIRECTED 90 tablet 0    ibuprofen 600 mg tablet Take 1 tablet (600 mg) by mouth every 6 hours if needed for moderate pain (4 - 6) for up to 20 doses. 20 tablet 0    lisinopril 20 mg tablet Take 1 tablet (20 mg) by mouth once daily. 90 tablet 3    [DISCONTINUED] dulaglutide (Trulicity) 1.5 mg/0.5 mL pen injector injection Inject 1.5 mg under the skin 1 (one) time per week. 2 mL 0     No current facility-administered medications on file prior to visit.     PATIENT EDUCATION/GOALS  - Target goal 5% to 10% weight loss within 3-6 months  - Counseled patient on MOA, expectations, side effects, duration of therapy, contraindications, administration, and monitoring parameters  - Answered all patient questions and concerns; provided [facility] phone number if issues/questions arise    RECOMMENDATIONS/PLAN  Problem List Items Addressed This Visit       Obesity     INCREASE Trulicity to 3 mg weekly.  Encouraged patient to continue listening to her body when she feels full to stop eating to avoid stomach upset.  Recommend she start going to gym again with a goal of 30 mins exercise 3-5 days per week to help maximize her weight loss potential.         Relevant  Medications    dulaglutide (Trulicity) 3 mg/0.5 mL pen injector    Other Relevant Orders    Follow Up In Advanced Primary Care - Pharmacy     Clinical Pharmacist follow up: 3/20 1pm    Continue all meds under the continuation of care with the referring provider and clinical pharmacy team.    Thank you,  Tfifanie Lynch, PharmD     Verbal consent to manage patient's drug therapy was obtained from patient. They were informed they may decline to participate or withdraw from participation in pharmacy services at any time.

## 2024-03-11 ENCOUNTER — PHARMACY VISIT (OUTPATIENT)
Dept: PHARMACY | Facility: CLINIC | Age: 59
End: 2024-03-11
Payer: MEDICAID

## 2024-03-11 PROCEDURE — RXMED WILLOW AMBULATORY MEDICATION CHARGE

## 2024-03-20 ENCOUNTER — TELEMEDICINE (OUTPATIENT)
Dept: PHARMACY | Facility: HOSPITAL | Age: 59
End: 2024-03-20
Payer: COMMERCIAL

## 2024-03-20 DIAGNOSIS — E66.01 CLASS 3 SEVERE OBESITY DUE TO EXCESS CALORIES WITH SERIOUS COMORBIDITY AND BODY MASS INDEX (BMI) OF 40.0 TO 44.9 IN ADULT (MULTI): ICD-10-CM

## 2024-03-20 NOTE — ASSESSMENT & PLAN NOTE
Continue Trulicity 3 mg weekly.  Encouraged patient to continue listening to her body when she feels full to stop eating to avoid stomach upset.  Recommend she start going to gym again with a goal of 30 mins exercise 3-5 days per week to help maximize her weight loss potential.

## 2024-03-20 NOTE — PROGRESS NOTES
Pharmacist Clinic: Weight Management    Miri Tavarez was referred to the Clinical Pharmacy Team for weight management.     Referring Provider: Manolo Tinoco MD    HISTORY OF PRESENT ILLNESS    - No ADR's so far with Trulicity; definitely feeling appetite reduction  - Nervous about losing too much weight and wants to do slower titration    Diet:  - Appetite lowered still  - 2 meals/day  - Snacks a lot throughout the day (candy, chips)  - Bfast: Turkey robles and boiled eggs; omelet   - Dinner: salad  - Drinks: water; powerade/gatorade     Exercise Routine:   - Has started going to the gym    Weight loss: 7 lbs; has been fluctuating up and down     PHARMACY ASSESSMENT    CURRENT WEIGHT LOSS PHARMACOTHERAPY  - Trulicity 3 mg weekly Sundays    No issues reported in regards to [accessibility, affordability, adherence, adverse effects, or organization]    DRUG INTERACTIONS  - No significant drug-drug interactions exist that require adjustment to therapy    Allergies: Amoxicillin     [id number] - [place], [place] - [address]  [address] [facility] [address]  Phone: [phone] Fax: [phone]    [facility]/pharmacy #[id number] - [place], [place] - [address] [address] AT [address] [facility]  [address] [facility] [address]  Phone: [phone] Fax: [phone]    [facility] - ESTRELLA Patterson - [address] [facility] AT Portal to Registered [facility] Sites  [address] [facility]  [place] [address]  Phone: [phone] Fax: [phone]    [facility]  [address] [facility] [address] [facility] [facility] [address] [place] [address]  Phone: [phone] Fax: [phone]    LAB  Lab Results   Component Value Date    BILITOT 0.3 11/11/2023    CALCIUM 9.1 11/11/2023    CO2 34 (H) 11/11/2023     11/11/2023    CREATININE 0.72 11/11/2023    GLUCOSE 114 (H) 11/11/2023    ALKPHOS 54 11/11/2023    K 3.7 11/11/2023    PROT 6.5 11/11/2023     (H) 11/11/2023    AST  9 11/11/2023    ALT 11 11/11/2023    BUN 17 11/11/2023    ANIONGAP 13 11/11/2023    ALBUMIN 3.6 11/11/2023    EGFR >90 11/11/2023     Lab Results   Component Value Date    TRIG 218 (H) 11/11/2023    CHOL 230 (H) 11/11/2023    LDLCALC 98 11/11/2023    HDL 88.0 11/11/2023     Lab Results   Component Value Date    HGBA1C 6.2 (H) 11/11/2023       Current Outpatient Medications on File Prior to Visit   Medication Sig Dispense Refill    amLODIPine (Norvasc) 5 mg tablet TAKE ONE TABLET BY MOUTH DAILY AS DIRECTED 90 tablet 0    cholecalciferol (Vitamin D-3) 1,250 mcg (50,000 unit) capsule Take 1 capsule (50,000 Units) by mouth.      clindamycin (Cleocin T) 1 % gel Apply topically once daily in the morning. Apply to full face.      dulaglutide (Trulicity) 3 mg/0.5 mL pen injector Inject 3 mg under the skin 1 (one) time per week. 2 mL 1    hydroCHLOROthiazide (HYDRODiuril) 25 mg tablet TAKE ONE TABLET BY MOUTH DAILY AS DIRECTED 90 tablet 0    ibuprofen 600 mg tablet Take 1 tablet (600 mg) by mouth every 6 hours if needed for moderate pain (4 - 6) for up to 20 doses. 20 tablet 0    lisinopril 20 mg tablet Take 1 tablet (20 mg) by mouth once daily. 90 tablet 3     No current facility-administered medications on file prior to visit.     PATIENT EDUCATION/GOALS  - Target goal 5% to 10% weight loss within 3-6 months  - Counseled patient on MOA, expectations, side effects, duration of therapy, contraindications, administration, and monitoring parameters  - Answered all patient questions and concerns; provided [facility] phone number if issues/questions arise    RECOMMENDATIONS/PLAN  Problem List Items Addressed This Visit       Obesity     Continue Trulicity 3 mg weekly.  Encouraged patient to continue listening to her body when she feels full to stop eating to avoid stomach upset.  Recommend she start going to gym again with a goal of 30 mins exercise 3-5 days per week to help maximize her weight loss potential.         Relevant Orders     Follow Up In Advanced Primary Care - Pharmacy       Clinical Pharmacist follow up: 4/17 1pm    Continue all meds under the continuation of care with the referring provider and clinical pharmacy team.    Thank you,  Tiffanie Lynch, PharmD     Verbal consent to manage patient's drug therapy was obtained from patient. They were informed they may decline to participate or withdraw from participation in pharmacy services at any time.

## 2024-04-05 ENCOUNTER — PHARMACY VISIT (OUTPATIENT)
Dept: PHARMACY | Facility: CLINIC | Age: 59
End: 2024-04-05
Payer: MEDICAID

## 2024-04-05 PROCEDURE — RXMED WILLOW AMBULATORY MEDICATION CHARGE

## 2024-04-17 ENCOUNTER — TELEMEDICINE (OUTPATIENT)
Dept: PHARMACY | Facility: HOSPITAL | Age: 59
End: 2024-04-17
Payer: COMMERCIAL

## 2024-04-17 DIAGNOSIS — E66.01 CLASS 3 SEVERE OBESITY DUE TO EXCESS CALORIES WITH SERIOUS COMORBIDITY AND BODY MASS INDEX (BMI) OF 40.0 TO 44.9 IN ADULT (MULTI): ICD-10-CM

## 2024-04-17 RX ORDER — DULAGLUTIDE 3 MG/.5ML
3 INJECTION, SOLUTION SUBCUTANEOUS
Qty: 2 ML | Refills: 0 | Status: SHIPPED | OUTPATIENT
Start: 2024-04-17 | End: 2024-05-15 | Stop reason: SDUPTHER

## 2024-04-17 RX ORDER — DULAGLUTIDE 3 MG/.5ML
3 INJECTION, SOLUTION SUBCUTANEOUS
Qty: 2 ML | Refills: 0 | Status: SHIPPED | OUTPATIENT
Start: 2024-04-21 | End: 2024-04-17 | Stop reason: WASHOUT

## 2024-04-17 NOTE — ASSESSMENT & PLAN NOTE
Continue Trulicity 3 mg weekly. Patient wishes to continue current dose.  Encouraged patient to continue listening to her body when she feels full to stop eating to avoid stomach upset.  Recommend she start going to gym again with a goal of 30 mins exercise 3-5 days per week to help maximize her weight loss potential.

## 2024-04-17 NOTE — PROGRESS NOTES
Pharmacist Clinic: Weight Management    Miri Tavarez was referred to the Clinical Pharmacy Team for weight management.     Referring Provider: Manolo Tinoco MD    HISTORY OF PRESENT ILLNESS    - No ADR's so far with Trulicity; definitely feeling appetite reduction  - Nervous about losing too much weight and wants to do slower titration    Diet:  - Appetite lowered still  - 2 meals/day  - Snacks a lot throughout the day (candy, chips)  - Bfast: Turkey robles and boiled eggs; omelet   - Dinner: salad  - Drinks: water; powerade/gatorade     Exercise Routine:   - Has started going to the gym    Weight loss: 10 lbs; has been fluctuating up and down     PHARMACY ASSESSMENT    CURRENT WEIGHT LOSS PHARMACOTHERAPY  - Trulicity 3 mg weekly Sundays    No issues reported in regards to [accessibility, affordability, adherence, adverse effects, or organization]    DRUG INTERACTIONS  - No significant drug-drug interactions exist that require adjustment to therapy    Allergies: Amoxicillin     [id number] - [place], [place] - [address]  [address] [facility] [address]  Phone: [phone] Fax: [phone]    [facility]/pharmacy #[id number] - [place], [place] - [address] [facility] [address]  [address] [address] [facility] [address]  Phone: [phone] Fax: [phone]    [facility] - ESTRELLA Patterson - [address] [facility] AT Portal to Registered [facility] Sites  [address] [facility]  [place] [address]  Phone: [phone] Fax: [phone]    [facility]  [address] [facility] [address] [place] [address]  Phone: [phone] Fax: [phone]    LAB  Lab Results   Component Value Date    BILITOT 0.3 11/11/2023    CALCIUM 9.1 11/11/2023    CO2 34 (H) 11/11/2023     11/11/2023    CREATININE 0.72 11/11/2023    GLUCOSE 114 (H) 11/11/2023    ALKPHOS 54 11/11/2023    K 3.7 11/11/2023    PROT 6.5 11/11/2023     (H) 11/11/2023    AST  9 11/11/2023    ALT 11 11/11/2023    BUN 17 11/11/2023    ANIONGAP 13 11/11/2023    ALBUMIN 3.6 11/11/2023    EGFR >90 11/11/2023     Lab Results   Component Value Date    TRIG 218 (H) 11/11/2023    CHOL 230 (H) 11/11/2023    LDLCALC 98 11/11/2023    HDL 88.0 11/11/2023     Lab Results   Component Value Date    HGBA1C 6.2 (H) 11/11/2023       Current Outpatient Medications on File Prior to Visit   Medication Sig Dispense Refill    amLODIPine (Norvasc) 5 mg tablet TAKE ONE TABLET BY MOUTH DAILY AS DIRECTED 90 tablet 0    cholecalciferol (Vitamin D-3) 1,250 mcg (50,000 unit) capsule Take 1 capsule (50,000 Units) by mouth.      clindamycin (Cleocin T) 1 % gel Apply topically once daily in the morning. Apply to full face.      hydroCHLOROthiazide (HYDRODiuril) 25 mg tablet TAKE ONE TABLET BY MOUTH DAILY AS DIRECTED 90 tablet 0    ibuprofen 600 mg tablet Take 1 tablet (600 mg) by mouth every 6 hours if needed for moderate pain (4 - 6) for up to 20 doses. 20 tablet 0    lisinopril 20 mg tablet Take 1 tablet (20 mg) by mouth once daily. 90 tablet 3    [DISCONTINUED] dulaglutide (Trulicity) 3 mg/0.5 mL pen injector Inject 3 mg under the skin 1 (one) time per week. 2 mL 1     No current facility-administered medications on file prior to visit.     PATIENT EDUCATION/GOALS  - Target goal 5% to 10% weight loss within 3-6 months  - Counseled patient on MOA, expectations, side effects, duration of therapy, contraindications, administration, and monitoring parameters  - Answered all patient questions and concerns; provided [facility] phone number if issues/questions arise    RECOMMENDATIONS/PLAN  Problem List Items Addressed This Visit       Obesity     Continue Trulicity 3 mg weekly. Patient wishes to continue current dose.  Encouraged patient to continue listening to her body when she feels full to stop eating to avoid stomach upset.  Recommend she start going to gym again with a goal of 30 mins exercise 3-5 days per week to help  maximize her weight loss potential.         Relevant Medications    dulaglutide (Trulicity) 3 mg/0.5 mL pen injector (Start on 4/21/2024)    Other Relevant Orders    Follow Up In Advanced Primary Care - Pharmacy     Clinical Pharmacist follow up: 5/15 1pm    Continue all meds under the continuation of care with the referring provider and clinical pharmacy team.    Thank you,  Tiffanie Lynch, PharmD     Verbal consent to manage patient's drug therapy was obtained from patient. They were informed they may decline to participate or withdraw from participation in pharmacy services at any time.

## 2024-05-01 ENCOUNTER — OFFICE VISIT (OUTPATIENT)
Dept: ORTHOPEDIC SURGERY | Facility: HOSPITAL | Age: 59
End: 2024-05-01
Payer: COMMERCIAL

## 2024-05-01 ENCOUNTER — HOSPITAL ENCOUNTER (OUTPATIENT)
Dept: RADIOLOGY | Facility: HOSPITAL | Age: 59
Discharge: HOME | End: 2024-05-01
Payer: COMMERCIAL

## 2024-05-01 DIAGNOSIS — M79.661 PAIN IN RIGHT SHIN: Primary | ICD-10-CM

## 2024-05-01 DIAGNOSIS — M79.661 PAIN IN RIGHT SHIN: ICD-10-CM

## 2024-05-01 DIAGNOSIS — S86.891A MEDIAL TIBIAL STRESS SYNDROME, RIGHT, INITIAL ENCOUNTER: ICD-10-CM

## 2024-05-01 DIAGNOSIS — M21.41 PES PLANUS OF BOTH FEET: ICD-10-CM

## 2024-05-01 DIAGNOSIS — M21.42 PES PLANUS OF BOTH FEET: ICD-10-CM

## 2024-05-01 PROCEDURE — 73590 X-RAY EXAM OF LOWER LEG: CPT | Mod: RIGHT SIDE | Performed by: RADIOLOGY

## 2024-05-01 PROCEDURE — 99214 OFFICE O/P EST MOD 30 MIN: CPT | Performed by: FAMILY MEDICINE

## 2024-05-01 PROCEDURE — 73590 X-RAY EXAM OF LOWER LEG: CPT | Mod: RT

## 2024-05-01 PROCEDURE — 3008F BODY MASS INDEX DOCD: CPT | Performed by: FAMILY MEDICINE

## 2024-05-01 RX ORDER — MELOXICAM 15 MG/1
15 TABLET ORAL DAILY
Qty: 30 TABLET | Refills: 1 | Status: SHIPPED | OUTPATIENT
Start: 2024-05-01 | End: 2024-06-30

## 2024-05-01 ASSESSMENT — PAIN SCALES - GENERAL: PAINLEVEL_OUTOF10: 7

## 2024-05-01 ASSESSMENT — PAIN - FUNCTIONAL ASSESSMENT: PAIN_FUNCTIONAL_ASSESSMENT: 0-10

## 2024-05-01 NOTE — PROGRESS NOTES
Patient is here for follow up on her left leg/tib hairline fx and its bothering her a lot worse.    Sports Medicine Office Note    Today's Date:  05/01/2024     HPI: Miri Tavarez is a 58 y.o.  with h/o Lt tibial stress fracture 9/2020, morbid obesity, ankylosing spondylitis not on DMARD who works at [employer] here for new complaint of right shin pain.     Patient known to Dr. Walker from past, Briefly, patient was seen on 9/1/2020, she presented for evaluation of left knee and lower leg pain. She had gone to emergency department and then followed up with another community orthopedist. She was was given a brace and not much explanation of her pain. She had a negative ultrasound for DVT in the emergency department. She still had intermittent knee pain and swelling but this is recently improved. Her second complaint is pain over the distal medial tibia for the past 2 months. She was found to have left tibial stress fracture and was placed on boot.     On 10/13/2020, she returns for 6-week follow-up of her left tibia stress fracture. Overall she is approximately 40% better. She continued with boot walker with weightbearing as tolerated and was sent to physical therapy.     On 11/10/2020, she returns for 4-week follow-up of her left tibia stress fracture and she had weaning hersellf off the boot. She reports she is doing very well and is no longer having knee or shin pain. She was told to follow up 6-8 weeks     On 7/20/2021 patient returns complaining of right knee pain which has been ongoing for 4-5 years. She reports she has been diagnosed with osteoarthritis and was told by her rheumatologist. She localizes her pain all over her right knee which is intermittent and also reports stiffness in the morning which improves with activity. Pain gets worse with walking on concrete. She reports that taking Advil 200 mg helps with the pain however she has not been taking it as she does not like to take medications, including  her blood pressure medication. Overall she has been taking Advil 200 mg about 3 times a week. Her second complaint is left shin pain. She localizes the pain in the medial portion of her tibia which started about a month ago after she bought a new pair of shoes. After that incident patient states that she has had pain with any shoe that she walks. She reports pain location is similar to the stress fracture that she had in 2020. She does state that after November 2020 she was pain-free until a month ago. She denies any trauma, falls, numbness or tingling or radiculopathy. We discussed use of meloxicam daily and physical therapy for improvement in her right knee pain and left tibial pain.     On, 9/14/2021, she returns for repeat examination of her right patellar tendinitis and left tibial pain. She states that she has had 0 overall improvement in either of her pains. She says it does have its moments where it does feel somewhat better but it is still present. Since her last visit she has been able to only participate in 1 physical therapy session in which they focused on the right patellar tendinitis. She states that it took significant amount of time for insurance to approve the physical therapy and so she just started last week. She has been wearing her walking boot on the left side consistently. She continues to work at [employer] and is on her feet often throughout the day. She uses her meloxicam 15 mg daily which is helpful for her pain. She denies new trauma or fall. Her overall symptoms have not changed in severity, quality or distribution.     On 10/26/2021, she returns for follow-up examination of her right knee pain and left tibial pain. She stated that she has had significant overall improvement in her right knee pain. She has 100% resolution in her symptoms on the right side. She is using a patellar strap. She has been participating in physical therapy for her right knee and overall had 4 sessions. She has  had no new traumas or falls. She has no new complaints with the right knee today. She is using a patellar strap for the right knee as well. For her left tibial pain she has been using the walking boot daily since her previous visit. Over the last 1/2 weeks that she is started to attempt to transition herself out of the boot. She states that when she is out of the boot she has no worsening of her symptoms however she is not had an extended period outside of the boot. She does use her OTC orthotic in her boot. She indicates that she is overall 60% improved in her overall pain. She has not participated in physical therapy for this problem. She denies a new fall or injury to the left tibia. SHe is doing much better since her last visit as her right knee pain is completely resolved. Her left shin pain is 60% better. We encouraged her to progress out of the boot walker and continue using her over-the-counter arch supports that she has been wearing in the boot. We have readjusted her physical therapy prescription and she will take this to her visit tomorrow. She will progress her activities of walking at work. I am happy to see her back in 6 to 8 weeks if her symptoms persist or worsen.     On 3/29/2022, she returns for a 5-month follow-up of chronic right knee pain. She recently saw her rheumatologist with complaints of a Baker's cyst and he referred her here for treatment. Today she feels her swelling is down. Most of her pain is behind the knee. She is also complaining of left shin pain. She was able to progress out of the walker boot shortly after we saw her in October and did very well up until about the past 3 to 4 weeks when her pain returned without injury or trauma. She is out of meloxicam. She states that she only went to 2 physical therapy sessions due to scheduling problems with work. She has no other acute complaints today.  We agreed that she has no swelling behind her knee today and therefore there is  "nothing to aspirate. We did review that she has arthritis of the knee which is causing her intermittent pain and swelling symptoms. She likely needs to be on meloxicam daily and was given a new prescription. Regarding her left shin pain, we need to get her custom foot orthotics and a prescription was given. We will send her back to PT and I strongly encouraged her to go more than twice. We will plan to see her back in 6 to 8 weeks for recheck.     5/1/2024: Patient presents for new complaint of right shin pain has been going on for the past 2 months.  Insidious onset, no inciting injury.  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  The pain is worse when she is on her feet, better when she is resting.  She has not tried any medications as \"she is not a pill person\".  She does wear bilateral patellar tendon straps for her knee pain, but she has been wearing them inferior to the patella tendon.  She has been wearing her orthotics that she was prescribed at last visit, but they have been old, so she just went to get a new prescription for the last week.  There was a 2 and half months pain where she does not remember orthotics and she thinks this exacerbated her pain.    Physical Examination:     The right lower leg is without obvious signs of acute bony deformity, swelling, erythema, ecchymosis. No knee joint effusion. There is tenderness over the medial border of the midportion of the tibia.  Range of motion and strength of the knee and ankle are full and symmetrical.  1+ pitting edema bilaterally.  The opposite lower leg is nontender and stable. Gait is pain-free and tandem.      Imaging:  Radiographs of the right tibia-fibula were reviewed and revealed no acute fracture or dislocation.  The studies were reviewed by Dr. Walker in the office today.      Problem List Items Addressed This Visit             ICD-10-CM    Flat foot M21.40     Other Visit Diagnoses "         Codes    Pain in right shin    -  Primary M79.661    Relevant Orders    XR tibia fibula right 2 views    Medial tibial stress syndrome, right, initial encounter     S86.722L    Relevant Medications    meloxicam (Mobic) 15 mg tablet            Assessment and Plan:     We reviewed the exam and x-ray findings and discussed the conservative and surgical treatment options. We agreed that her pain in her right shin is due to medial tibial stress syndrome likely secondary to pes planus.  She is already ordered a new pair of custom orthotics, she should wear these at all times.  For pain control, meloxicam 15 mg daily prescribed.  If the pain does not improve, return to office.    **This note was dictated using Dragon speech recognition software and was not corrected for spelling or grammatical errors**.    Mahin Bonner DO  Sports Medicine Fellow  [facility] [place]

## 2024-05-14 DIAGNOSIS — I10 HYPERTENSION, UNSPECIFIED TYPE: ICD-10-CM

## 2024-05-14 RX ORDER — AMLODIPINE BESYLATE 5 MG/1
5 TABLET ORAL DAILY
Qty: 90 TABLET | Refills: 1 | Status: SHIPPED | OUTPATIENT
Start: 2024-05-14

## 2024-05-14 RX ORDER — HYDROCHLOROTHIAZIDE 25 MG/1
25 TABLET ORAL DAILY
Qty: 90 TABLET | Refills: 1 | Status: SHIPPED | OUTPATIENT
Start: 2024-05-14

## 2024-05-15 ENCOUNTER — TELEMEDICINE (OUTPATIENT)
Dept: PHARMACY | Facility: HOSPITAL | Age: 59
End: 2024-05-15
Payer: COMMERCIAL

## 2024-05-15 DIAGNOSIS — E66.01 CLASS 3 SEVERE OBESITY DUE TO EXCESS CALORIES WITH SERIOUS COMORBIDITY AND BODY MASS INDEX (BMI) OF 40.0 TO 44.9 IN ADULT (MULTI): ICD-10-CM

## 2024-05-15 PROCEDURE — RXMED WILLOW AMBULATORY MEDICATION CHARGE

## 2024-05-15 RX ORDER — DULAGLUTIDE 3 MG/.5ML
3 INJECTION, SOLUTION SUBCUTANEOUS
Qty: 2 ML | Refills: 3 | Status: SHIPPED | OUTPATIENT
Start: 2024-05-15

## 2024-05-15 NOTE — PROGRESS NOTES
Pharmacist Clinic: Weight Management    Miri Tavarez was referred to the Clinical Pharmacy Team for weight management.     Referring Provider: Manolo Tinoco MD    HISTORY OF PRESENT ILLNESS    - No ADR's so far with Trulicity; definitely feeling appetite reduction  - Nervous about losing too much weight and wants to do slower titration    Diet:  - Appetite lowered still  - 2 meals/day  - Snacks a lot throughout the day (candy, chips)  - Bfast: Turkey robles and boiled eggs; omelet   - Dinner: salad  - Drinks: water; powerade/gatorade     Exercise Routine:   - Has started going to the gym    Weight loss: 10 lbs; has been fluctuating up and down   - Has gained a couple lbs back    PHARMACY ASSESSMENT    CURRENT WEIGHT LOSS PHARMACOTHERAPY  - Trulicity 3 mg weekly Sundays    No issues reported in regards to [accessibility, affordability, adherence, adverse effects, or organization]    DRUG INTERACTIONS  - No significant drug-drug interactions exist that require adjustment to therapy    Allergies: Amoxicillin     [id number] [place], [place] - [address] [facility] AT [facility] OF [address] [address]  Phone: [phone] Fax: [phone]    [facility]  [address] [facility] [address]  [facility] [address]  Phone: [phone] Fax: [phone]    LAB  Lab Results   Component Value Date    BILITOT 0.3 11/11/2023    CALCIUM 9.1 11/11/2023    CO2 34 (H) 11/11/2023     11/11/2023    CREATININE 0.72 11/11/2023    GLUCOSE 114 (H) 11/11/2023    ALKPHOS 54 11/11/2023    K 3.7 11/11/2023    PROT 6.5 11/11/2023     (H) 11/11/2023    AST 9 11/11/2023    ALT 11 11/11/2023    BUN 17 11/11/2023    ANIONGAP 13 11/11/2023    ALBUMIN 3.6 11/11/2023    EGFR >90 11/11/2023     Lab Results   Component Value Date    TRIG 218 (H) 11/11/2023    CHOL 230 (H) 11/11/2023    LDLCALC 98 11/11/2023    HDL 88.0 11/11/2023     Lab Results   Component Value Date    HGBA1C 6.2 (H)  11/11/2023       Current Outpatient Medications on File Prior to Visit   Medication Sig Dispense Refill    amLODIPine (Norvasc) 5 mg tablet Take 1 tablet (5 mg) by mouth once daily. 90 tablet 1    cholecalciferol (Vitamin D-3) 1,250 mcg (50,000 unit) capsule Take 1 capsule (50,000 Units) by mouth.      clindamycin (Cleocin T) 1 % gel Apply topically once daily in the morning. Apply to full face.      hydroCHLOROthiazide (HYDRODiuril) 25 mg tablet Take 1 tablet (25 mg) by mouth once daily. 90 tablet 1    ibuprofen 600 mg tablet Take 1 tablet (600 mg) by mouth every 6 hours if needed for moderate pain (4 - 6) for up to 20 doses. 20 tablet 0    lisinopril 20 mg tablet Take 1 tablet (20 mg) by mouth once daily. 90 tablet 3    meloxicam (Mobic) 15 mg tablet Take 1 tablet (15 mg) by mouth once daily. 30 tablet 1    [DISCONTINUED] amLODIPine (Norvasc) 5 mg tablet TAKE ONE TABLET BY MOUTH DAILY AS DIRECTED 90 tablet 0    [DISCONTINUED] dulaglutide (Trulicity) 3 mg/0.5 mL pen injector Inject 3 mg under the skin 1 (one) time per week. 2 mL 0    [DISCONTINUED] hydroCHLOROthiazide (HYDRODiuril) 25 mg tablet TAKE ONE TABLET BY MOUTH DAILY AS DIRECTED 90 tablet 0     No current facility-administered medications on file prior to visit.     PATIENT EDUCATION/GOALS  - Target goal 5% to 10% weight loss within 3-6 months  - Counseled patient on MOA, expectations, side effects, duration of therapy, contraindications, administration, and monitoring parameters  - Answered all patient questions and concerns; provided [facility] phone number if issues/questions arise    RECOMMENDATIONS/PLAN  Problem List Items Addressed This Visit       Obesity    Relevant Medications    dulaglutide (Trulicity) 3 mg/0.5 mL pen injector    Other Relevant Orders    Follow Up In Advanced Primary Care - Pharmacy     ASSESSMENT:  Patient with baseline BMI = 41.83 taking Trulicity off label for weight loss. Has been out a few weeks d/t backorder.    PLAN:  Restart  Trulciity 3 mg weekly  Continue working on diet and exercise    Clinical Pharmacist follow up: 6/12 1pm    Continue all meds under the continuation of care with the referring provider and clinical pharmacy team.    Thank you,  Tiffanie Lynch, PharmD     Verbal consent to manage patient's drug therapy was obtained from patient. They were informed they may decline to participate or withdraw from participation in pharmacy services at any time.

## 2024-05-17 ENCOUNTER — APPOINTMENT (OUTPATIENT)
Dept: ORTHOPEDIC SURGERY | Facility: HOSPITAL | Age: 59
End: 2024-05-17
Payer: COMMERCIAL

## 2024-05-22 ENCOUNTER — PHARMACY VISIT (OUTPATIENT)
Dept: PHARMACY | Facility: CLINIC | Age: 59
End: 2024-05-22
Payer: MEDICAID

## 2024-05-22 DIAGNOSIS — R73.03 PREDIABETES: ICD-10-CM

## 2024-05-22 DIAGNOSIS — E66.01 CLASS 3 SEVERE OBESITY DUE TO EXCESS CALORIES WITH SERIOUS COMORBIDITY AND BODY MASS INDEX (BMI) OF 40.0 TO 44.9 IN ADULT (MULTI): Primary | ICD-10-CM

## 2024-05-22 PROCEDURE — RXMED WILLOW AMBULATORY MEDICATION CHARGE

## 2024-05-22 RX ORDER — DULAGLUTIDE 1.5 MG/.5ML
1.5 INJECTION, SOLUTION SUBCUTANEOUS
Qty: 2 ML | Refills: 1 | Status: SHIPPED | OUTPATIENT
Start: 2024-05-22

## 2024-06-10 ENCOUNTER — PHARMACY VISIT (OUTPATIENT)
Dept: PHARMACY | Facility: CLINIC | Age: 59
End: 2024-06-10
Payer: MEDICAID

## 2024-06-12 ENCOUNTER — APPOINTMENT (OUTPATIENT)
Dept: PHARMACY | Facility: HOSPITAL | Age: 59
End: 2024-06-12
Payer: COMMERCIAL

## 2024-06-12 DIAGNOSIS — E66.01 CLASS 3 SEVERE OBESITY DUE TO EXCESS CALORIES WITH SERIOUS COMORBIDITY AND BODY MASS INDEX (BMI) OF 40.0 TO 44.9 IN ADULT (MULTI): ICD-10-CM

## 2024-06-12 NOTE — PROGRESS NOTES
Pharmacist Clinic: Weight Management    Miri Tavarez was referred to the Clinical Pharmacy Team for weight management.     Referring Provider: Manolo Tinoco MD    HISTORY OF PRESENT ILLNESS    - No ADR's so far with Trulicity; definitely feeling appetite reduction  - Nervous about losing too much weight and wants to do slower titration    Diet:  - Appetite lowered still  - 2 meals/day  - Snacks a lot throughout the day (candy, chips)  - Bfast: Turkey robles and boiled eggs; omelet   - Dinner: salad  - Drinks: water; powerade/gatorade     Exercise Routine:   - Has started going to the gym    Weight loss: 10 lbs; has been fluctuating up and down   - Has gained a couple lbs back    PHARMACY ASSESSMENT    CURRENT WEIGHT LOSS PHARMACOTHERAPY  - Trulicity 1.5 mg weekly Sundays    No issues reported in regards to [accessibility, affordability, adherence, adverse effects, or organization]    DRUG INTERACTIONS  - No significant drug-drug interactions exist that require adjustment to therapy    Allergies: Amoxicillin     [id number] - [place], [place] - [address] [facility] AT [facility] OF [address] [address]  Phone: [phone] Fax: [phone]     [facility]  [address] [facility], [facility] [address]  [facility] [address]  Phone: [phone] Fax: [phone]    LAB  Lab Results   Component Value Date    BILITOT 0.3 11/11/2023    CALCIUM 9.1 11/11/2023    CO2 34 (H) 11/11/2023     11/11/2023    CREATININE 0.72 11/11/2023    GLUCOSE 114 (H) 11/11/2023    ALKPHOS 54 11/11/2023    K 3.7 11/11/2023    PROT 6.5 11/11/2023     (H) 11/11/2023    AST 9 11/11/2023    ALT 11 11/11/2023    BUN 17 11/11/2023    ANIONGAP 13 11/11/2023    ALBUMIN 3.6 11/11/2023    EGFR >90 11/11/2023     Lab Results   Component Value Date    TRIG 218 (H) 11/11/2023    CHOL 230 (H) 11/11/2023    LDLCALC 98 11/11/2023    HDL 88.0 11/11/2023     Lab Results   Component Value Date    HGBA1C 6.2 (H)  11/11/2023       Current Outpatient Medications on File Prior to Visit   Medication Sig Dispense Refill    amLODIPine (Norvasc) 5 mg tablet Take 1 tablet (5 mg) by mouth once daily. 90 tablet 1    cholecalciferol (Vitamin D-3) 1,250 mcg (50,000 unit) capsule Take 1 capsule (50,000 Units) by mouth.      clindamycin (Cleocin T) 1 % gel Apply topically once daily in the morning. Apply to full face.      dulaglutide (Trulicity) 3 mg/0.5 mL pen injector Inject 3 mg under the skin 1 (one) time per week. 2 mL 3    hydroCHLOROthiazide (HYDRODiuril) 25 mg tablet Take 1 tablet (25 mg) by mouth once daily. 90 tablet 1    ibuprofen 600 mg tablet Take 1 tablet (600 mg) by mouth every 6 hours if needed for moderate pain (4 - 6) for up to 20 doses. 20 tablet 0    lisinopril 20 mg tablet Take 1 tablet (20 mg) by mouth once daily. 90 tablet 3    meloxicam (Mobic) 15 mg tablet Take 1 tablet (15 mg) by mouth once daily. 30 tablet 1    [DISCONTINUED] dulaglutide (Trulicity) 1.5 mg/0.5 mL pen injector injection Inject 1.5 mg under the skin 1 (one) time per week. 2 mL 1     No current facility-administered medications on file prior to visit.     PATIENT EDUCATION/GOALS  - Target goal 5% to 10% weight loss within 3-6 months  - Counseled patient on MOA, expectations, side effects, duration of therapy, contraindications, administration, and monitoring parameters  - Answered all patient questions and concerns; provided [facility] phone number if issues/questions arise    RECOMMENDATIONS/PLAN  Problem List Items Addressed This Visit       Obesity    Relevant Orders    Follow Up In Advanced Primary Care - Pharmacy     ASSESSMENT:  Patient with baseline BMI = 41.83 taking Trulicity off label for weight loss. Had to take 1.5 mg d/t backorder. Was able to get 3 mg dose on Monday and will start this next week.    PLAN:  INCREASE Trulciity to 3 mg weekly  Continue working on diet and exercise    Clinical Pharmacist follow up: 7/10 130 pm    Continue all  meds under the continuation of care with the referring provider and clinical pharmacy team.    Thank you,  Tiffanie Lynch, PharmD     Verbal consent to manage patient's drug therapy was obtained from patient. They were informed they may decline to participate or withdraw from participation in pharmacy services at any time.

## 2024-06-28 RX ORDER — ASPIRIN 325 MG
50000 TABLET, DELAYED RELEASE (ENTERIC COATED) ORAL
Qty: 12 CAPSULE | Refills: 0 | OUTPATIENT
Start: 2024-06-30

## 2024-07-10 ENCOUNTER — HOSPITAL ENCOUNTER (OUTPATIENT)
Dept: RADIOLOGY | Facility: CLINIC | Age: 59
Discharge: HOME | End: 2024-07-10
Payer: COMMERCIAL

## 2024-07-10 ENCOUNTER — APPOINTMENT (OUTPATIENT)
Dept: PHARMACY | Facility: HOSPITAL | Age: 59
End: 2024-07-10
Payer: COMMERCIAL

## 2024-07-10 VITALS — HEIGHT: 59 IN | WEIGHT: 220.46 LBS | BODY MASS INDEX: 44.44 KG/M2

## 2024-07-10 DIAGNOSIS — Z12.31 SCREENING MAMMOGRAM FOR BREAST CANCER: ICD-10-CM

## 2024-07-10 DIAGNOSIS — E66.01 CLASS 3 SEVERE OBESITY DUE TO EXCESS CALORIES WITH SERIOUS COMORBIDITY AND BODY MASS INDEX (BMI) OF 40.0 TO 44.9 IN ADULT (MULTI): ICD-10-CM

## 2024-07-10 PROCEDURE — RXMED WILLOW AMBULATORY MEDICATION CHARGE

## 2024-07-10 PROCEDURE — 77067 SCR MAMMO BI INCL CAD: CPT

## 2024-07-10 RX ORDER — DULAGLUTIDE 4.5 MG/.5ML
4.5 INJECTION, SOLUTION SUBCUTANEOUS
Qty: 2 ML | Refills: 11 | Status: SHIPPED | OUTPATIENT
Start: 2024-07-14

## 2024-07-10 NOTE — PROGRESS NOTES
Pharmacist Clinic: Weight Management    Miri Tavarez was referred to the Clinical Pharmacy Team for weight management.     Referring Provider: Manolo Tinoco MD  - Last visit: 11/9/23    HISTORY OF PRESENT ILLNESS    - Patient has Medicaid; only Trulicity or Victoza can be filled without a PA  - Ate poorly over the holiday weekend and weight loss is still at a plateau (about 10 lbs down)    Diet:  - Appetite lowered still  - 2 meals/day  - Snacks a lot throughout the day (candy, chips)  - Bfast: Turkey robles and boiled eggs; omelet   - Dinner: salad  - Drinks: water; powerade/gatorade     Exercise Routine:   - Has started going to the gym    Weight loss: 10 lbs; has been fluctuating up and down; still the same this visit    Adverse effects: none    PHARMACY ASSESSMENT    CURRENT WEIGHT LOSS PHARMACOTHERAPY  - Trulicity 3 mg weekly Sundays    No issues reported in regards to [accessibility, affordability, adherence, adverse effects, or organization]    DRUG INTERACTIONS  - No significant drug-drug interactions exist that require adjustment to therapy    Allergies: Amoxicillin     [id number] [address] [facility] AT [facility] OF [place] [address]  Phone: [phone] Fax: [phone]     [facility]  [address] [facility], [facility] [address]  [place] [id number]  Phone: [phone] Fax: [phone]    [id number] - [facility] [address] [facility]  [address] [place] [address]  Phone: [phone] Fax: [phone]    LAB  Lab Results   Component Value Date    BILITOT 0.3 11/11/2023    CALCIUM 9.1 11/11/2023    CO2 34 (H) 11/11/2023     11/11/2023    CREATININE 0.72 11/11/2023    GLUCOSE 114 (H) 11/11/2023    ALKPHOS 54 11/11/2023    K 3.7 11/11/2023    PROT 6.5 11/11/2023     (H) 11/11/2023    AST 9 11/11/2023    ALT 11 11/11/2023    BUN 17 11/11/2023    ANIONGAP 13 11/11/2023    ALBUMIN 3.6  11/11/2023    EGFR >90 11/11/2023     Lab Results   Component Value Date    TRIG 218 (H) 11/11/2023    CHOL 230 (H) 11/11/2023    LDLCALC 98 11/11/2023    HDL 88.0 11/11/2023     Lab Results   Component Value Date    HGBA1C 6.2 (H) 11/11/2023       Current Outpatient Medications on File Prior to Visit   Medication Sig Dispense Refill    amLODIPine (Norvasc) 5 mg tablet Take 1 tablet (5 mg) by mouth once daily. 90 tablet 1    cholecalciferol (Vitamin D-3) 1,250 mcg (50,000 unit) capsule Take 1 capsule (50,000 Units) by mouth.      clindamycin (Cleocin T) 1 % gel Apply topically once daily in the morning. Apply to full face.      hydroCHLOROthiazide (HYDRODiuril) 25 mg tablet Take 1 tablet (25 mg) by mouth once daily. 90 tablet 1    ibuprofen 600 mg tablet Take 1 tablet (600 mg) by mouth every 6 hours if needed for moderate pain (4 - 6) for up to 20 doses. 20 tablet 0    lisinopril 20 mg tablet Take 1 tablet (20 mg) by mouth once daily. 90 tablet 3    [DISCONTINUED] dulaglutide (Trulicity) 3 mg/0.5 mL pen injector Inject 3 mg under the skin 1 (one) time per week. 2 mL 3     No current facility-administered medications on file prior to visit.     PATIENT EDUCATION/GOALS  - Target goal 5% to 10% weight loss within 3-6 months  - Counseled patient on MOA, expectations, side effects, duration of therapy, contraindications, administration, and monitoring parameters  - Answered all patient questions and concerns; provided [facility] phone number if issues/questions arise    RECOMMENDATIONS/PLAN  Problem List Items Addressed This Visit       Obesity    Relevant Medications    dulaglutide (Trulicity) 4.5 mg/0.5 mL pen injector (Start on 7/14/2024)    Other Relevant Orders    Follow Up In Advanced Primary Care - Pharmacy     ASSESSMENT:  Patient with baseline BMI = 41.83 taking Trulicity off label for weight loss. She admittedly ate poorly over the holiday weekend and wasn't as on top of her diet this month, and hasn't lost any  further weight. Tolerating well. Feels ready for dose increase again.    PLAN:  INCREASE Trulciity to 4.5 mg weekly  Continue working on diet and exercise    Clinical Pharmacist follow up: 8/7 130 pm    Continue all meds under the continuation of care with the referring provider and clinical pharmacy team.    Thank you,  Tiffanie Lynch, PharmD     Verbal consent to manage patient's drug therapy was obtained from patient. They were informed they may decline to participate or withdraw from participation in pharmacy services at any time.

## 2024-07-17 ENCOUNTER — PHARMACY VISIT (OUTPATIENT)
Dept: PHARMACY | Facility: CLINIC | Age: 59
End: 2024-07-17
Payer: MEDICAID

## 2024-08-07 ENCOUNTER — APPOINTMENT (OUTPATIENT)
Dept: PHARMACY | Facility: HOSPITAL | Age: 59
End: 2024-08-07
Payer: COMMERCIAL

## 2024-08-07 DIAGNOSIS — E66.01 CLASS 3 SEVERE OBESITY DUE TO EXCESS CALORIES WITH SERIOUS COMORBIDITY AND BODY MASS INDEX (BMI) OF 40.0 TO 44.9 IN ADULT (MULTI): ICD-10-CM

## 2024-08-07 NOTE — PROGRESS NOTES
Pharmacist Clinic: Weight Management    Miri Tavarez was referred to the Clinical Pharmacy Team for weight management.     Referring Provider: Manolo Tinoco MD  - Last visit: 11/9/23    HISTORY OF PRESENT ILLNESS    - Patient has Medicaid; only Trulicity or Victoza can be filled without a PA    Diet:  - Appetite lowered still  - 2 meals/day  - Snacks a lot throughout the day (candy, chips)  - Bfast: Turkey robles and boiled eggs; omelet   - Dinner: salad  - Drinks: water; powerade/gatorade     Exercise Routine:   - Has started going to the gym    Weight loss: 14 lbs; has been fluctuating up and down    Adverse effects: none    PHARMACY ASSESSMENT    CURRENT WEIGHT LOSS PHARMACOTHERAPY  - Trulicity 4.5 mg weekly Sundays    No issues reported in regards to [accessibility, affordability, adherence, adverse effects, or organization]    DRUG INTERACTIONS  - No significant drug-drug interactions exist that require adjustment to therapy    Allergies: Amoxicillin     [id number] - [place], [place] - [address] [facility] AT CORNER OF [facility]  [address] [place] [address]  Phone: [phone] Fax: [phone]    [facility]  [address] [facility] [address]  [place] [address]  Phone: [phone] Fax: [phone]    [id number] - [facility] [address] [address]  [address] [place] [id number]  Phone: [phone] Fax: [phone]    LAB  Lab Results   Component Value Date    BILITOT 0.3 11/11/2023    CALCIUM 9.1 11/11/2023    CO2 34 (H) 11/11/2023     11/11/2023    CREATININE 0.72 11/11/2023    GLUCOSE 114 (H) 11/11/2023    ALKPHOS 54 11/11/2023    K 3.7 11/11/2023    PROT 6.5 11/11/2023     (H) 11/11/2023    AST 9 11/11/2023    ALT 11 11/11/2023    BUN 17 11/11/2023    ANIONGAP 13 11/11/2023    ALBUMIN 3.6 11/11/2023    EGFR >90 11/11/2023     Lab Results   Component Value Date    TRIG 218 (H) 11/11/2023    CHOL 230 (H) 11/11/2023     LDLCALC 98 11/11/2023    HDL 88.0 11/11/2023     Lab Results   Component Value Date    HGBA1C 6.2 (H) 11/11/2023       Current Outpatient Medications on File Prior to Visit   Medication Sig Dispense Refill    amLODIPine (Norvasc) 5 mg tablet Take 1 tablet (5 mg) by mouth once daily. 90 tablet 1    cholecalciferol (Vitamin D-3) 1,250 mcg (50,000 unit) capsule Take 1 capsule (50,000 Units) by mouth.      clindamycin (Cleocin T) 1 % gel Apply topically once daily in the morning. Apply to full face.      dulaglutide (Trulicity) 4.5 mg/0.5 mL pen injector Inject 4.5 mg under the skin 1 (one) time per week. 2 mL 11    hydroCHLOROthiazide (HYDRODiuril) 25 mg tablet Take 1 tablet (25 mg) by mouth once daily. 90 tablet 1    ibuprofen 600 mg tablet Take 1 tablet (600 mg) by mouth every 6 hours if needed for moderate pain (4 - 6) for up to 20 doses. 20 tablet 0    lisinopril 20 mg tablet Take 1 tablet (20 mg) by mouth once daily. 90 tablet 3     No current facility-administered medications on file prior to visit.     PATIENT EDUCATION/GOALS  - Target goal 5% to 10% weight loss within 3-6 months  - Counseled patient on MOA, expectations, side effects, duration of therapy, contraindications, administration, and monitoring parameters  - Answered all patient questions and concerns; provided [facility] phone number if issues/questions arise    RECOMMENDATIONS/PLAN  Problem List Items Addressed This Visit       Obesity    Relevant Orders    Follow Up In Advanced Primary Care - Pharmacy     ASSESSMENT:  Patient with baseline BMI = 41.83 taking Trulicity off label for weight loss. She lost a few more lbs this month and is feeling good at 4.5 mg dose. Will plan to continue and push out to a 2 month follow up. Reminded patient November is 1 year since she last saw Dr. Tinoco, so she should consider making a yearly appointment soon.    PLAN:  CONTINUE Trulciity 4.5 mg weekly  Continue working on diet and exercise    Clinical Pharmacist  follow up: 10/2 130 pm    Continue all meds under the continuation of care with the referring provider and clinical pharmacy team.    Thank you,  Tiffanie Lynch, PharmD     Verbal consent to manage patient's drug therapy was obtained from patient. They were informed they may decline to participate or withdraw from participation in pharmacy services at any time.

## 2024-09-06 PROCEDURE — RXMED WILLOW AMBULATORY MEDICATION CHARGE

## 2024-09-09 ENCOUNTER — PHARMACY VISIT (OUTPATIENT)
Dept: PHARMACY | Facility: CLINIC | Age: 59
End: 2024-09-09
Payer: MEDICAID

## 2024-09-30 PROCEDURE — RXMED WILLOW AMBULATORY MEDICATION CHARGE

## 2024-09-30 NOTE — PROGRESS NOTES
Pharmacist Clinic: Weight Management    Miri Tavarez was referred to the Clinical Pharmacy Team for weight management.     Referring Provider: Manolo Tinoco MD  - Last visit: 11/9/23  - Next visit: Not Scheduled     HISTORY OF PRESENT ILLNESS    - Patient has Medicaid; only Trulicity or Victoza can be filled without a PA    Diet:  - Appetite lowered still  - 2 meals/day  - Snacks a lot throughout the day (candy, chips)  - Bfast: Turkey robles and boiled eggs; omelet   - Dinner: salad  - Drinks: water; powerade/gatorade     Exercise Routine:   - Has started going to the gym    Weight loss: 14 lbs - has not weighed self since last visit but endorses feeling as if she has lost weight    Adverse effects: none    PHARMACY ASSESSMENT    CURRENT WEIGHT LOSS PHARMACOTHERAPY  - Trulicity 4.5 mg weekly Sundays    No issues reported in regards to [accessibility, affordability, adherence, adverse effects, or organization]    DRUG INTERACTIONS  - No significant drug-drug interactions exist that require adjustment to therapy    Allergies: Amoxicillin     [id number] - [facility] [address] [facility] AT [facility]  [address] [place], [place] [address]  Phone: [phone] Fax: [phone]     [facility]  [address] [place] [address]  Phone: [phone] Fax: [phone]    [id number] - [facility] [address]  [address] [place] [id number]  Phone: [phone] Fax: [phone]    LAB  Lab Results   Component Value Date    BILITOT 0.3 11/11/2023    CALCIUM 9.1 11/11/2023    CO2 34 (H) 11/11/2023     11/11/2023    CREATININE 0.72 11/11/2023    GLUCOSE 114 (H) 11/11/2023    ALKPHOS 54 11/11/2023    K 3.7 11/11/2023    PROT 6.5 11/11/2023     (H) 11/11/2023    AST 9 11/11/2023    ALT 11 11/11/2023    BUN 17 11/11/2023    ANIONGAP 13 11/11/2023    ALBUMIN 3.6 11/11/2023    EGFR >90 11/11/2023     Lab  Results   Component Value Date    TRIG 218 (H) 11/11/2023    CHOL 230 (H) 11/11/2023    LDLCALC 98 11/11/2023    HDL 88.0 11/11/2023     Lab Results   Component Value Date    HGBA1C 6.2 (H) 11/11/2023       Current Outpatient Medications on File Prior to Visit   Medication Sig Dispense Refill    amLODIPine (Norvasc) 5 mg tablet Take 1 tablet (5 mg) by mouth once daily. 90 tablet 1    cholecalciferol (Vitamin D-3) 1,250 mcg (50,000 unit) capsule Take 1 capsule (50,000 Units) by mouth.      clindamycin (Cleocin T) 1 % gel Apply topically once daily in the morning. Apply to full face.      hydroCHLOROthiazide (HYDRODiuril) 25 mg tablet Take 1 tablet (25 mg) by mouth once daily. 90 tablet 1    ibuprofen 600 mg tablet Take 1 tablet (600 mg) by mouth every 6 hours if needed for moderate pain (4 - 6) for up to 20 doses. 20 tablet 0    lisinopril 20 mg tablet Take 1 tablet (20 mg) by mouth once daily. 90 tablet 3    [DISCONTINUED] dulaglutide (Trulicity) 4.5 mg/0.5 mL pen injector Inject 4.5 mg under the skin 1 (one) time per week. 2 mL 11     No current facility-administered medications on file prior to visit.     PATIENT EDUCATION/GOALS  - Target goal 5% to 10% weight loss within 3-6 months  - Counseled patient on MOA, expectations, side effects, duration of therapy, contraindications, administration, and monitoring parameters  - Answered all patient questions and concerns; provided [facility] phone number if issues/questions arise    RECOMMENDATIONS/PLAN  Problem List Items Addressed This Visit       Obesity    Relevant Medications    dulaglutide (Trulicity) 4.5 mg/0.5 mL pen injector (Start on 10/6/2024)    Other Relevant Orders    Follow Up In Advanced Primary Care - Pharmacy     ASSESSMENT:  Patient with baseline BMI = 41.83 taking Trulicity off label for weight loss. She lost a few more lbs this month and is feeling good at 4.5 mg dose. Will plan to continue and follow up in 3 months Reminded patient November is 1 year  since she last saw Dr. Tinoco she plans on calling today to set up an appointment.     PLAN:  CONTINUE Trulciity 4.5 mg weekly  Sent refills to [facility] for mail order  Continue working on diet and exercise  Provided patient with [facility] phone number for any additional questions or concerns prior to follow up visit [phone]     Follow up with Clinical Pharmacy Team 1/8/2025 at 2:30pm    Time spent with pt: Total length of time 10 (minutes) of the encounter and more than 50% was spent counseling the patient.    Continue all meds under the continuation of care with the referring provider and clinical pharmacy team.    Please reach out to the Clinical Pharmacy Team if there are any further questions.     Verbal consent to manage patient's drug therapy was obtained from patient. They were informed they may decline to participate or withdraw from participation in pharmacy services at any time.    Kavita Welch, PharmD  Clinical Pharmacy Specialist   [phone]

## 2024-10-01 ENCOUNTER — PHARMACY VISIT (OUTPATIENT)
Dept: PHARMACY | Facility: CLINIC | Age: 59
End: 2024-10-01
Payer: MEDICAID

## 2024-10-02 ENCOUNTER — APPOINTMENT (OUTPATIENT)
Dept: PHARMACY | Facility: HOSPITAL | Age: 59
End: 2024-10-02
Payer: COMMERCIAL

## 2024-10-02 DIAGNOSIS — E66.01 CLASS 3 SEVERE OBESITY DUE TO EXCESS CALORIES WITH SERIOUS COMORBIDITY AND BODY MASS INDEX (BMI) OF 40.0 TO 44.9 IN ADULT: ICD-10-CM

## 2024-10-02 DIAGNOSIS — E66.813 CLASS 3 SEVERE OBESITY DUE TO EXCESS CALORIES WITH SERIOUS COMORBIDITY AND BODY MASS INDEX (BMI) OF 40.0 TO 44.9 IN ADULT: ICD-10-CM

## 2024-10-02 RX ORDER — DULAGLUTIDE 4.5 MG/.5ML
4.5 INJECTION, SOLUTION SUBCUTANEOUS
Qty: 2 ML | Refills: 11 | Status: SHIPPED | OUTPATIENT
Start: 2024-10-06

## 2024-10-25 ENCOUNTER — PHARMACY VISIT (OUTPATIENT)
Dept: PHARMACY | Facility: CLINIC | Age: 59
End: 2024-10-25
Payer: MEDICAID

## 2024-10-25 PROCEDURE — RXMED WILLOW AMBULATORY MEDICATION CHARGE

## 2024-12-09 DIAGNOSIS — I10 HYPERTENSION, UNSPECIFIED TYPE: ICD-10-CM

## 2024-12-09 RX ORDER — AMLODIPINE BESYLATE 5 MG/1
5 TABLET ORAL DAILY
Qty: 90 TABLET | Refills: 1 | Status: SHIPPED | OUTPATIENT
Start: 2024-12-09

## 2024-12-17 ENCOUNTER — HOSPITAL ENCOUNTER (EMERGENCY)
Facility: HOSPITAL | Age: 59
Discharge: HOME | End: 2024-12-18
Payer: MEDICARE

## 2024-12-17 ENCOUNTER — APPOINTMENT (OUTPATIENT)
Dept: RADIOLOGY | Facility: HOSPITAL | Age: 59
End: 2024-12-17
Payer: MEDICARE

## 2024-12-17 VITALS
TEMPERATURE: 99.1 F | RESPIRATION RATE: 18 BRPM | HEIGHT: 59 IN | WEIGHT: 210 LBS | DIASTOLIC BLOOD PRESSURE: 76 MMHG | OXYGEN SATURATION: 100 % | HEART RATE: 94 BPM | BODY MASS INDEX: 42.33 KG/M2 | SYSTOLIC BLOOD PRESSURE: 161 MMHG

## 2024-12-17 DIAGNOSIS — S39.012A LUMBAR STRAIN, INITIAL ENCOUNTER: Primary | ICD-10-CM
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
## 2024-12-17 DIAGNOSIS — M54.50 ACUTE BILATERAL LOW BACK PAIN WITHOUT SCIATICA: ICD-10-CM

## 2024-12-17 PROCEDURE — 72100 X-RAY EXAM L-S SPINE 2/3 VWS: CPT | Performed by: STUDENT IN AN ORGANIZED HEALTH CARE EDUCATION/TRAINING PROGRAM

## 2024-12-17 PROCEDURE — 99283 EMERGENCY DEPT VISIT LOW MDM: CPT

## 2024-12-17 PROCEDURE — 72100 X-RAY EXAM L-S SPINE 2/3 VWS: CPT

## 2024-12-17 ASSESSMENT — PAIN SCALES - GENERAL: PAINLEVEL_OUTOF10: 7

## 2024-12-17 ASSESSMENT — PAIN DESCRIPTION - FREQUENCY: FREQUENCY: CONSTANT/CONTINUOUS

## 2024-12-17 ASSESSMENT — PAIN DESCRIPTION - ORIENTATION: ORIENTATION: LOWER;MID

## 2024-12-17 ASSESSMENT — COLUMBIA-SUICIDE SEVERITY RATING SCALE - C-SSRS
6. HAVE YOU EVER DONE ANYTHING, STARTED TO DO ANYTHING, OR PREPARED TO DO ANYTHING TO END YOUR LIFE?: NO
1. IN THE PAST MONTH, HAVE YOU WISHED YOU WERE DEAD OR WISHED YOU COULD GO TO SLEEP AND NOT WAKE UP?: NO
2. HAVE YOU ACTUALLY HAD ANY THOUGHTS OF KILLING YOURSELF?: NO

## 2024-12-17 ASSESSMENT — PAIN DESCRIPTION - DESCRIPTORS: DESCRIPTORS: ACHING

## 2024-12-17 ASSESSMENT — PAIN DESCRIPTION - LOCATION: LOCATION: BACK

## 2024-12-17 ASSESSMENT — PAIN DESCRIPTION - ONSET: ONSET: GRADUAL

## 2024-12-17 ASSESSMENT — PAIN DESCRIPTION - PROGRESSION: CLINICAL_PROGRESSION: NOT CHANGED

## 2024-12-17 ASSESSMENT — PAIN DESCRIPTION - PAIN TYPE: TYPE: ACUTE PAIN

## 2024-12-17 ASSESSMENT — PAIN - FUNCTIONAL ASSESSMENT: PAIN_FUNCTIONAL_ASSESSMENT: 0-10

## 2024-12-17 NOTE — Clinical Note
Miri Tavarez was seen and treated in our emergency department on 12/17/2024.  She may return to work on 12/19/2024.       If you have any questions or concerns, please don't hesitate to call.      Anais Knight, YURI-CNP

## 2024-12-18 PROCEDURE — 2500000001 HC RX 250 WO HCPCS SELF ADMINISTERED DRUGS (ALT 637 FOR MEDICARE OP): Performed by: NURSE PRACTITIONER

## 2024-12-18 RX ORDER — TRAMADOL HYDROCHLORIDE 50 MG/1
25 TABLET ORAL ONCE
Status: DISCONTINUED | OUTPATIENT
Start: 2024-12-18 | End: 2024-12-18

## 2024-12-18 RX ORDER — IBUPROFEN 600 MG/1
600 TABLET ORAL EVERY 6 HOURS PRN
Qty: 28 TABLET | Refills: 0 | Status: SHIPPED | OUTPATIENT
Start: 2024-12-18 | End: 2024-12-25

## 2024-12-18 RX ORDER — CYCLOBENZAPRINE HCL 10 MG
10 TABLET ORAL 2 TIMES DAILY PRN
Qty: 14 TABLET | Refills: 0 | Status: SHIPPED | OUTPATIENT
Start: 2024-12-18 | End: 2024-12-25

## 2024-12-18 RX ORDER — LIDOCAINE 50 MG/G
1 PATCH TOPICAL DAILY
Qty: 7 PATCH | Refills: 0 | Status: SHIPPED | OUTPATIENT
Start: 2024-12-18 | End: 2024-12-25

## 2024-12-18 RX ORDER — TRAMADOL HYDROCHLORIDE 50 MG/1
50 TABLET ORAL ONCE
Status: COMPLETED | OUTPATIENT
Start: 2024-12-18 | End: 2024-12-18

## 2024-12-18 RX ADMIN — TRAMADOL HYDROCHLORIDE 50 MG: 50 TABLET, COATED ORAL at 01:10

## 2024-12-18 ASSESSMENT — PAIN DESCRIPTION - LOCATION: LOCATION: BACK

## 2024-12-18 ASSESSMENT — PAIN DESCRIPTION - ORIENTATION: ORIENTATION: RIGHT;LOWER

## 2024-12-18 ASSESSMENT — PAIN SCALES - GENERAL: PAINLEVEL_OUTOF10: 8
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
## 2025-01-08 ENCOUNTER — OFFICE VISIT (OUTPATIENT)
Dept: ENDOCRINOLOGY | Facility: CLINIC | Age: 60
End: 2025-01-08
Payer: COMMERCIAL

## 2025-01-08 ENCOUNTER — APPOINTMENT (OUTPATIENT)
Dept: PHARMACY | Facility: HOSPITAL | Age: 60
End: 2025-01-08
Payer: COMMERCIAL

## 2025-01-08 VITALS — BODY MASS INDEX: 41.53 KG/M2 | HEIGHT: 59 IN | WEIGHT: 206 LBS

## 2025-01-08 DIAGNOSIS — E66.813 CLASS 3 SEVERE OBESITY WITHOUT SERIOUS COMORBIDITY WITH BODY MASS INDEX (BMI) OF 40.0 TO 44.9 IN ADULT, UNSPECIFIED OBESITY TYPE: Primary | ICD-10-CM

## 2025-01-08 DIAGNOSIS — I10 PRIMARY HYPERTENSION: ICD-10-CM

## 2025-01-08 DIAGNOSIS — E78.2 MIXED HYPERLIPIDEMIA: ICD-10-CM

## 2025-01-08 DIAGNOSIS — Z00.00 HEALTHCARE MAINTENANCE: ICD-10-CM

## 2025-01-08 DIAGNOSIS — R73.03 PREDIABETES: ICD-10-CM

## 2025-01-08 DIAGNOSIS — E66.01 CLASS 3 SEVERE OBESITY WITHOUT SERIOUS COMORBIDITY WITH BODY MASS INDEX (BMI) OF 40.0 TO 44.9 IN ADULT, UNSPECIFIED OBESITY TYPE: Primary | ICD-10-CM

## 2025-01-08 LAB — POC HEMOGLOBIN A1C: 5.9 % (ref 4.2–6.5)

## 2025-01-08 PROCEDURE — 83036 HEMOGLOBIN GLYCOSYLATED A1C: CPT | Performed by: NURSE PRACTITIONER

## 2025-01-08 PROCEDURE — 3008F BODY MASS INDEX DOCD: CPT | Performed by: NURSE PRACTITIONER

## 2025-01-08 PROCEDURE — 99204 OFFICE O/P NEW MOD 45 MIN: CPT | Performed by: NURSE PRACTITIONER

## 2025-01-08 RX ORDER — NALTREXONE HYDROCHLORIDE AND BUPROPION HYDROCHLORIDE 8; 90 MG/1; MG/1
TABLET, EXTENDED RELEASE ORAL
Qty: 360 TABLET | Refills: 0 | Status: SHIPPED | OUTPATIENT
Start: 2025-01-08

## 2025-01-08 ASSESSMENT — ENCOUNTER SYMPTOMS
POLYDIPSIA: 0
PALPITATIONS: 0
POLYPHAGIA: 0
OCCASIONAL FEELINGS OF UNSTEADINESS: 0
FATIGUE: 0
DEPRESSION: 0
ARTHRALGIAS: 0
DYSPHORIC MOOD: 0
WHEEZING: 0
NAUSEA: 0
NERVOUS/ANXIOUS: 0
FREQUENCY: 0
SHORTNESS OF BREATH: 0
NUMBNESS: 0
LOSS OF SENSATION IN FEET: 0
CONSTIPATION: 0

## 2025-01-08 ASSESSMENT — PATIENT HEALTH QUESTIONNAIRE - PHQ9
1. LITTLE INTEREST OR PLEASURE IN DOING THINGS: NOT AT ALL
SUM OF ALL RESPONSES TO PHQ9 QUESTIONS 1 AND 2: 0
2. FEELING DOWN, DEPRESSED OR HOPELESS: NOT AT ALL

## 2025-01-08 ASSESSMENT — PAIN SCALES - GENERAL: PAINLEVEL_OUTOF10: 0-NO PAIN

## 2025-01-08 NOTE — PROGRESS NOTES
"Miri Tavarez presents for weight loss management and obesity consult today. She is self referred.    She is frustrated with with her weight.  She notes that since 2020 her motivation has changed and she has also increased in weight with inability to lose weight    Obesity History:  Childhood or teen obesity history: no  Age of Onset: mid adult years  Lowest adult weight: 178  Highest adult weight: 190  Current weight: 206     Family history of obesity: yes    Biggest challenge with weight management: \"I can't get the weight off\"  Goal: \"I would like to restart working out I don't want to be a diabetic\"     History of Weight Loss Efforts: yes  Successful weight loss techniques attempted:  boot camp  Unsuccessful weight loss techniques attempted:  Trulicity    Current typical daily diet:  Typical Breakfast: boiled eggs, turkey sausage or robles, waffle with turkey sausage, scrambled  Typical Lunch: \"my downfall, I really don't eat lunch\"  Typical Dinner: take out or beans and rice from [employer]  Soda/latte weekly intake:  Take out/carry out/fast food weekly:  Portion sizes/seconds with meals: between medium and large    Snacking  Daytime snacks: yes  Evening snacks: yes      Describe Appetite (always hungry/no hunger/average): before meals  Food noise: no  Are you full after a meal?  Sometimes yes and sometimes I want to eat more  Emotional eater? No   Boredom eater? No     Current Exercise Habits none    Sleep patterns (insomnia, waking in night) /hours of sleep per night: no CASE, 6-7 hours per night  H/O Sleep apnea: no  Well rested? Yes     Increased Stressors (describe daily stress): no      Any intake of an appetite suppressant or an anti-obesity medicine? No     H/O Pancreatitis: no  H/O Thyroid Medullary Cancer: no    Past Medical History:   Diagnosis Date    Acute vaginitis 08/22/2018    Acute vaginitis    Encounter for gynecological examination (general) (routine) without abnormal findings 09/18/2016    " Visit for routine gyn exam    Localized edema 04/24/2017    Lower leg edema    Otalgia, left ear 04/27/2017    Otalgia, left    Pain in left shoulder 09/21/2016    Pain in shoulder region, left    Personal history of other diseases of the nervous system and sense organs 02/09/2018    History of drainage from eye    Personal history of other diseases of the respiratory system 02/01/2018    History of acute pharyngitis    Personal history of other infectious and parasitic diseases 08/17/2018    History of dermatophytosis    Reaction to severe stress, unspecified 03/14/2016    Situational stress    Shortness of breath 05/08/2018    SOB (shortness of breath) on exertion    Sprain of unspecified ligament of right ankle, initial encounter 03/14/2016    Right ankle sprain    Strain of unspecified muscle, fascia and tendon at shoulder and upper arm level, right arm, initial encounter 04/24/2017    Right shoulder strain    Unspecified acute conjunctivitis, left eye 02/09/2018    Acute bacterial conjunctivitis of left eye       Current Outpatient Medications   Medication Sig Dispense Refill    amLODIPine (Norvasc) 5 mg tablet TAKE 1 TABLET BY MOUTH EVERY DAY 90 tablet 1    cholecalciferol (Vitamin D-3) 1,250 mcg (50,000 unit) capsule Take 1 capsule (50,000 Units) by mouth.      clindamycin (Cleocin T) 1 % gel Apply topically once daily in the morning. Apply to full face.      dulaglutide (Trulicity) 4.5 mg/0.5 mL pen injector Inject 4.5 mg under the skin 1 (one) time per week. 2 mL 11    hydroCHLOROthiazide (HYDRODiuril) 25 mg tablet TAKE 1 TABLET BY MOUTH EVERY DAY 90 tablet 0    cyclobenzaprine (Flexeril) 10 mg tablet Take 1 tablet (10 mg) by mouth 2 times a day as needed for muscle spasms for up to 7 days. 14 tablet 0    lisinopril 20 mg tablet Take 1 tablet (20 mg) by mouth once daily. 90 tablet 3     No current facility-administered medications for this visit.           Review of Systems  Review of Systems  "  Constitutional:  Negative for fatigue.   Respiratory:  Negative for shortness of breath and wheezing.    Cardiovascular:  Negative for chest pain and palpitations.   Gastrointestinal:  Negative for constipation and nausea.   Endocrine: Negative for polydipsia, polyphagia and polyuria.   Genitourinary:  Negative for frequency and urgency.   Musculoskeletal:  Negative for arthralgias.   Skin:  Negative for rash.   Neurological:  Negative for numbness.   Psychiatric/Behavioral:  Negative for dysphoric mood. The patient is not nervous/anxious.            Objective   Ht 1.499 m (4' 11\")   Wt 93.4 kg (206 lb)   BMI 41.61 kg/m²     Physical Exam  Physical Exam  Vitals reviewed.   Constitutional:       General: She is not in acute distress.     Appearance: Normal appearance. She is obese.   Neck:      Comments: Thyroid exam is normal, no palpable nodules  No goiter is noted  Cardiovascular:      Rate and Rhythm: Normal rate and regular rhythm.   Pulmonary:      Effort: Pulmonary effort is normal.      Breath sounds: Normal breath sounds.   Skin:     General: Skin is warm and dry.   Neurological:      Mental Status: She is alert and oriented to person, place, and time.   Psychiatric:         Mood and Affect: Mood normal.         Behavior: Behavior normal.         Thought Content: Thought content normal.         Judgment: Judgment normal.         Lab Review  Lab Results   Component Value Date    HGBA1C 6.2 (H) 11/11/2023     Lab Results   Component Value Date    LDLCALC 98 11/11/2023       Weight Trends  Trends of weight reviewed in visit, see graph below:  Wt Readings from Last 3 Encounters:   01/08/25 93.4 kg (206 lb)   12/17/24 95.3 kg (210 lb)   07/10/24 100 kg (220 lb 7.4 oz)   (  Assessment/Plan     Follow up and Goals:  Nutrition: Dietitian consult. Healthy Plate. Obtain portion plates. Food prep and recipe website: ADA Diabetes Food Hub. Instead skipping lunch- protein shake and a piece fruit. With every meal " focus on 20-30 grams of protein. With snacking focus on high protein and high fiber with low carb (apple with peanut butter, cottage cheese with fruit, Greek yogurt with berries, handful nuts with a fruit, roasted vegetables, veggie with humus)  Physical Fitness: Physical Exercise-YouTube or Apps: for free weight workouts- [employer], [employer], Burn Boot Camps -do modifications. Building muscle mass is important with weight loss process and maintenance of weight. It also benefit bone health and strength and decreases falls risk and assists with balance.  It additionally increases our resting metabolic states and decreases risk of muscle wasting with weight loss and in use of the GLP1 medications, additionally taking in protein rich foods is important.  Medications: see  below  Follow Up: Dietitian 1 month and then group      Problem List Items Addressed This Visit    None  Visit Diagnoses       Healthcare maintenance        Relevant Orders    POCT glycosylated hemoglobin (Hb A1C) manually resulted            Diet interventions: referral to dietitian for guidance in these changes  Discussed Mediterranean Diet, given pamphlet or it will be mailed, we looked at ADA plate, portion sizes, recipes. Advised to review in preparation for nutrition consult    Handouts given:  yes    Exercise intervention:   We discussed the importance of incorporating resistance and free weight  lifting into physical activity routine to prevent muscle wasting with weight loss, enhance bone health, the positive role increased muscle has in burning fat at rest. We looked at examples of these types of exercise routines online. Advised to do modifications. Advised to check with PCP if there is a h/o musculoskeletal injury or hx. We discussed benefits of walking with weight loss and as a cardio form of exercise. Gradually increase exercise to a goal of 150 minutes at least per week.          Follow Up:  Referred to nutrition or continue to work  with current dietitian   Discussed obesity medications, side effects and mechanism of action reviewed with patient  Discussed physical activity: we reviewed Youtube videos for strength training, advised to use modifications for these videos, we discussed benefits of strength training and resistance bands  on bone and muscle health, we discussed walking and water aerobic options for cardio  Discussed Mediterranean Diet, given pamphlet or it will be mailed, we looked at ADA plate, portion sizes, recipes. Advised to review Mediterranean Meal Plan booklet  in preparation for nutrition consult  ....  1 month group visit and nutrition visit in person or  virtual  Please reach out if you need anything or have further questions

## 2025-01-08 NOTE — LETTER
January 8, 2025     Patient: Miri Tavarez   YOB: 1965   Date of Visit: 1/8/2025       To Whom It May Concern:    Miri Tavarez was seen in my clinic on 1/8/2025 at 1:30 pm. Please excuse Miri for her absence from work on this day to make the appointment.    If you have any questions or concerns, please don't hesitate to call.         Sincerely,         Taniya Prather, APRN-CNP        CC: No Recipients

## 2025-01-08 NOTE — PATIENT INSTRUCTIONS
Nutrition: Dietitian consult. Healthy Plate. Obtain portion plates. Food prep and recipe website: ADA Diabetes Food Hub. Instead skipping lunch- protein shake and a piece fruit. With every meal focus on 20-30 grams of protein. With snacking focus on high protein and high fiber with low carb (apple with peanut butter, cottage cheese with fruit, Greek yogurt with berries, handful nuts with a fruit, roasted vegetables, veggie with humus)  Physical Fitness: Physical Exercise-YouTube or Apps: for free weight workouts- [employer], HasFit, Burn Boot Camps -do modifications. Building muscle mass is important with weight loss process and maintenance of weight. It also benefit bone health and strength and decreases falls risk and assists with balance.  It additionally increases our resting metabolic states and decreases risk of muscle wasting with weight loss and in use of the GLP1 medications, additionally taking in protein rich foods is important.  Medications: see  below  Follow Up: Dietitian 1 month and then group      [facility]: Obesity Program  Today we discussed some of the following medications.  If not prescribed already, please look into these medications on your own, and please call your insurance for coverage questions.  If you would like to contact our office with additional questions or a request for a prescription, write us via a [employer] message, or call [phone].    The following summarizes the medications currently available for weight management:    q    Phentermine (aka Adipex):   This medication is a stimulant and can suppress appetite. Common side effects include an increase in blood pressure (BP) and heart rate (HR), and excessive thirst. You will need to be able to monitor both BP and HR while on this medication. If you have any cardiac issues you may need to contact your cardiologist/PCP to get authorization to take the medication. This medication is a controlled substance  in the [facility] [place]. Per the law, you will need to sign a stimulant consent form when taking Phentermine.  Additionally, you will need to be seen in the office (in person) every 3 months when on this medication.  Please schedule appointments in advance to ensure that we comply with the [place] Law. Phentermine is usually the most cost effective of the appetite suppressants (usually no more than $40/month and can sometimes use GoodRX).  q Qsymia:   This is a combination of two medications: Phentermine and Topamax (aka Topiramate). Topamax is often given for migraine headaches and additionally contributes to appetite suppression.  Since phentermine is part of Qsymia, this medication combination is also considered a controlled substance.  A stimulant consent will need to be signed upon initiation of this medication. Similar to phentermine alone, Qsymia also requires an in person office visit every 3 months.  Schedule visits in advance to comply with the [place] Law.  The cost of the medication depends on the patient's individual insurance but if too costly, the medication can be sent to an online mail order pharmacy (directions below) for approximately $100/month. [email or web address]  you will need to go to this website, register in the top right hand corner. The online pharmacy will contact you for billing info and our office can electronically send the script to them.   q Contrave:  This medication is a combination of Wellbutrin (aka Buproprion) and Naltrexone. The medication combination helps to decrease appetite and sometimes cravings as well.   Contrave can cause opioid withdrawal.  Patients using chronic opioid therapy should not sure this medication.  Those who use temporary opioid therapy should hold contrave until 14 days after their last opioid dose.  When taking Contrave, a titration process is needed over the first month.   Titration:  For the first week you will take one tablet in the AM,  For  the 2nd week you will take one tablet in the AM and one tablet in the PM,   For the 3rd week you will take 2 tablets in the AM and one tablet in the PM, and   For the fourth week (and onward) you will take 2 tablets in the AM and 2 tablets in the PM.     The cost of this medication depends on the individual's insurance. There are 2 options if the medication is not affordable at the retail pharmacy:  It can be sent to a mail order pharmacy that approximates to ~ $100/month.    Mail order pharmacy- [email or web address] You will need to go to this website to register and our office will send an electronic script.  Our office can send each individual medication (Wellbutrin, Naltrexone) to your local   pharmacy.  If sending the two individual medications to your local pharmacy, the prescriptions will appear as: Wellbutrin 150mg tablet taken daily and Naltrexone 50mg- start taking half tablet for one week and then increase to one tablet daily.   q Wegovy:   This medication is a weekly injection (and actually contains the same ingredient as in Ozempic). This medication is FDA approved for weight loss.  Common side effects include nausea, vomiting, diarrhea or constipation and abdominal pain.  Often these GI side effects resolve with time.  Some patients have shared that injecting the medicine to the thigh area instead of the stomach area helps with the GI side effects.  Do not take this medication if you have a history of pancreatitis.  Additionally, do not take this medication if you or any family members have been diagnosed with Medullary Thyroid Cancer or Multiple Endocrine Neoplasia.  If insurance coverage is poor, retail price for this medication is approximately ~$1200/month.  q Zepbound:   This medication is a weekly injection (and actually contains the same ingredient as in Mounjaro). This medication is FDA approved for weight loss.  Common side effects include nausea, vomiting, diarrhea or  constipation and abdominal pain.  Often these GI side effects resolve with time.  Some patients have shared that injecting the medicine to the thigh area instead of the stomach area helps with the GI side effects.  Do not take this medication if you have a history of pancreatitis.  Additionally do not take this medication if you or any family members have been diagnosed with Medullary Thyroid Cancer or Multiple Endocrine Neoplasia.  If insurance coverage is poor, retail price for this medication is approximately ~$1200/month.  q Saxenda:   This medication is a daily injection.  This medication is FDA approved for weight loss.  Common side effects include nausea, vomiting, diarrhea or constipation and abdominal pain.  Often these GI side effects resolve with time.  Some patients have shared that injecting the medicine to the thigh area instead of the stomach area helps with the GI side effects.  Do not take this medication if you have a history of pancreatitis.  Additionally, do not take this medication if you or any family members have been diagnosed with Medullary Thyroid Cancer or Multiple Endocrine Neoplasia.  If insurance coverage is poor, retail price for this medication is approximately ~$1200/month.        q Ozempic:  This is a diabetes medication and is administered as a weekly injection. It is often NOT covered by insurance unless you are officially diagnosed with type 2 diabetes.  Common side effects include nausea, vomiting, diarrhea or constipation and abdominal pain.  Often these GI side effects resolve with time.  Some patients have shared that injecting the medicine to the thigh area instead of the stomach area helps with the GI side effects.  Do not take this medication if you have a history of pancreatitis.  Additionally, do not take this medication if you or any family members have been diagnosed with Medullary Thyroid Cancer or Multiple Endocrine Neoplasia.  If insurance coverage is poor, retail  price for this medication is approximately ~$1200/month.  q Mounjaro:   This is a diabetes medication and is administered as a weekly injection. It is often NOT covered by insurance unless you are officially diagnosed with type 2 diabetes.  Common side effects include nausea, vomiting, diarrhea or constipation and abdominal pain.  Often these GI side effects resolve with time.  Some patients have shared that injecting the medicine to the thigh area instead of the stomach area helps with the GI side effects.  Do not take this medication if you have a history of pancreatitis.  Additionally, do not take this medication if you or any family members have been diagnosed with Medullary Thyroid Cancer or Multiple Endocrine Neoplasia.  If insurance coverage is poor, retail price for this medication is approximately ~$1200/month.      Please always review the official side effect profile for the above medications with the Pharmacist if further questions arise.

## 2025-01-09 ENCOUNTER — APPOINTMENT (OUTPATIENT)
Dept: DERMATOLOGY | Facility: CLINIC | Age: 60
End: 2025-01-09
Payer: COMMERCIAL

## 2025-01-09 DIAGNOSIS — L57.8 DIFFUSE PHOTODAMAGE OF SKIN: ICD-10-CM

## 2025-01-09 DIAGNOSIS — L82.1 SEBORRHEIC KERATOSIS: ICD-10-CM

## 2025-01-09 DIAGNOSIS — L21.9 SEBORRHEIC DERMATITIS: Primary | ICD-10-CM

## 2025-01-09 DIAGNOSIS — L85.3 XEROSIS CUTIS: ICD-10-CM

## 2025-01-09 PROCEDURE — 99204 OFFICE O/P NEW MOD 45 MIN: CPT | Performed by: DERMATOLOGY

## 2025-01-09 RX ORDER — KETOCONAZOLE 20 MG/G
CREAM TOPICAL
Qty: 60 G | Refills: 11 | Status: SHIPPED | OUTPATIENT
Start: 2025-01-09

## 2025-01-09 RX ORDER — HYDROCORTISONE 25 MG/G
CREAM TOPICAL
Qty: 30 G | Refills: 2 | Status: SHIPPED | OUTPATIENT
Start: 2025-01-09

## 2025-01-09 RX ORDER — KETOCONAZOLE 20 MG/ML
SHAMPOO, SUSPENSION TOPICAL
Qty: 120 ML | Refills: 11 | Status: SHIPPED | OUTPATIENT
Start: 2025-01-09

## 2025-01-09 NOTE — PROGRESS NOTES
Elise     Miri Tavarez is a 59 y.o. female who presents for the following: Rash.  She reports she has been breaking out with red, dry, itchy patches on her face, especially between her eyebrows, around her nose, and on her inner cheeks, over the past several weeks.  She has tried using Opzelura cream, but the areas have not resolved.  She also notes a dry, flaky scalp.  She denies any other new, changing, or concerning skin lesions; no bleeding, itching, or burning lesions.      Review of Systems:  No other skin or systemic complaints other than what is documented elsewhere in the note.    The following portions of the chart were reviewed this encounter and updated as appropriate:       Skin Cancer History  No skin cancer on file.    Specialty Problems          Dermatology Problems    Other seborrheic dermatitis    Dermatophytosis    Eczema    Erythema of lower extremity    Skin rash       Past Dermatologic / Past Relevant Medical History:    - reported history of eczema and asthma  - no h/o allergic rhinitis, psoriasis, or skin cancer    Family History:    No family history of eczema or psoriasis    Allergies:  Patient has no known allergies.    Current Medications / CAM's:    Current Outpatient Medications:     amLODIPine (Norvasc) 5 mg tablet, TAKE 1 TABLET BY MOUTH EVERY DAY, Disp: 90 tablet, Rfl: 1    cholecalciferol (Vitamin D-3) 1,250 mcg (50,000 unit) capsule, Take 1 capsule (50,000 Units) by mouth., Disp: , Rfl:     clindamycin (Cleocin T) 1 % gel, Apply topically once daily in the morning. Apply to full face., Disp: , Rfl:     hydroCHLOROthiazide (HYDRODiuril) 25 mg tablet, TAKE 1 TABLET BY MOUTH EVERY DAY, Disp: 90 tablet, Rfl: 0    naltrexone-bupropion (Contrave) 8-90 mg ER tablet, Initiation: 1 tablet qAM days 1-7, then 1 tab BID days 8-14, then 2 tabs in AM + 1 tab in PM days 15-22, then 2 tabs BID., Disp: 360 tablet, Rfl: 0    cyclobenzaprine (Flexeril) 10 mg tablet, Take 1 tablet (10 mg) by  mouth 2 times a day as needed for muscle spasms for up to 7 days., Disp: 14 tablet, Rfl: 0    hydrocortisone 2.5 % cream, Apply twice daily to affected areas of face (avoid eyes) for 2 weeks, then taper to twice daily on weekends only; repeat every 6-8 weeks as needed for flares, Disp: 30 g, Rfl: 2    ketoconazole (NIZOral) 2 % cream, Apply twice daily to affected areas of face, Disp: 60 g, Rfl: 11    ketoconazole (NIZOral) 2 % shampoo, Wash affected areas of scalp 2-3 times weekly as directed, Disp: 120 mL, Rfl: 11    lisinopril 20 mg tablet, Take 1 tablet (20 mg) by mouth once daily., Disp: 90 tablet, Rfl: 3     Objective   Well appearing patient in no apparent distress; mood and affect are within normal limits.    A skin examination was performed including: Face, neck, and scalp. All findings within normal limits unless otherwise noted below.    Assessment/Plan   1. Seborrheic dermatitis  Head - Anterior (Face)  On the patient's face, mainly the glabella and bilateral eyebrows and perinasal creases, there are erythematous, scaly patches with whitish-yellowish, greasy scale.  On her scalp, there are pink, scaly patches with whitish-yellowish, greasy scale.    Seborrheic Dermatitis - flare on face and scalp.  The potentially chronic and intermittently flaring nature of this condition and treatment options were discussed extensively with the patient today.  At this time, for her face, I recommend combination topical therapy, including topical anti-fungal therapy with Ketoconazole 2% cream, which the patient was instructed to apply twice daily to the affected areas of the face, as well as topical steroid therapy with Hydrocortisone 2.5% cream, which the patient was instructed to apply twice daily to the affected areas of the face (avoid the eyes) for the next 2 weeks, followed by taper to twice daily on weekends only for persistent areas and/or maintenance; the patient may repeat treatment in a 2-week burst-and-taper  fashion every 6-8 weeks as needed for future flares.  In addition, for her scalp, I recommend topical anti-fungal therapy with Ketoconazole 2% shampoo, which the patient was instructed to use 2-3 days per week, alternating with over-the-counter anti-dandruff shampoos, such as Head & Shoulders, Selsun Blue, and Neutrogena T-gel, every month.  The risks, benefits, and side effects of these medications, including possible skin atrophy with overuse of topical steroids, were discussed.  The patient expressed understanding and is in agreement with this plan.    ketoconazole (NIZOral) 2 % cream - Head - Anterior (Face)  Apply twice daily to affected areas of face    ketoconazole (NIZOral) 2 % shampoo - Head - Anterior (Face)  Wash affected areas of scalp 2-3 times weekly as directed    hydrocortisone 2.5 % cream - Head - Anterior (Face)  Apply twice daily to affected areas of face (avoid eyes) for 2 weeks, then taper to twice daily on weekends only; repeat every 6-8 weeks as needed for flares    2. Xerosis cutis  Diffuse generalized dry, scaly skin.    Xerosis.  I emphasized the importance of dry, sensitive skin care, including the use of a mild soap, such as Dove, and frequent and aggressive moisturization, at least twice daily and immediately following showers or baths, with recommended over-the-counter moisturizing creams, such as Eucerin, Cetaphil, Cerave, or Aveeno, or Vaseline or Aquaphor ointments.    3. Seborrheic keratosis  Scattered on the patient's face and neck, there are multiple brown-colored, hyperkeratotic, stuck-on appearing papules of varying size and shape    Seborrheic Keratoses - the benign nature of these lesions was discussed with the patient today and reassurance provided.  No treatment is medically indicated for these lesions at this time.    4. Diffuse photodamage of skin  Photodistributed  Diffuse photodamage with actinic changes with telangiectasia and mottled pigmentation in sun-exposed  areas.    Photodamage.  The signs and symptoms of skin cancer were reviewed and the patient was advised to practice sun protection and sun avoidance, use daily sunscreen, and perform regular self skin exams.  Sun protection was discussed, including avoiding the mid-day sun, wearing a sunscreen with SPF at least 50, and stressing the need for reapplication of sunscreen and applying more than they think they need.

## 2025-02-05 ENCOUNTER — OFFICE VISIT (OUTPATIENT)
Dept: PRIMARY CARE | Facility: CLINIC | Age: 60
End: 2025-02-05
Payer: COMMERCIAL

## 2025-02-05 VITALS
HEIGHT: 59 IN | DIASTOLIC BLOOD PRESSURE: 71 MMHG | TEMPERATURE: 97.2 F | WEIGHT: 197 LBS | SYSTOLIC BLOOD PRESSURE: 149 MMHG | OXYGEN SATURATION: 96 % | BODY MASS INDEX: 39.72 KG/M2 | HEART RATE: 77 BPM

## 2025-02-05 DIAGNOSIS — M25.561 CHRONIC PAIN OF RIGHT KNEE: ICD-10-CM

## 2025-02-05 DIAGNOSIS — M25.69 BACK STIFFNESS: Primary | ICD-10-CM

## 2025-02-05 DIAGNOSIS — M54.9 CHRONIC UPPER BACK PAIN: ICD-10-CM

## 2025-02-05 DIAGNOSIS — G89.29 CHRONIC UPPER BACK PAIN: ICD-10-CM

## 2025-02-05 DIAGNOSIS — G89.29 CHRONIC PAIN OF RIGHT KNEE: ICD-10-CM

## 2025-02-05 PROCEDURE — 99214 OFFICE O/P EST MOD 30 MIN: CPT | Performed by: STUDENT IN AN ORGANIZED HEALTH CARE EDUCATION/TRAINING PROGRAM

## 2025-02-05 PROCEDURE — 1036F TOBACCO NON-USER: CPT | Performed by: STUDENT IN AN ORGANIZED HEALTH CARE EDUCATION/TRAINING PROGRAM

## 2025-02-05 PROCEDURE — 3077F SYST BP >= 140 MM HG: CPT | Performed by: STUDENT IN AN ORGANIZED HEALTH CARE EDUCATION/TRAINING PROGRAM

## 2025-02-05 PROCEDURE — 3008F BODY MASS INDEX DOCD: CPT | Performed by: STUDENT IN AN ORGANIZED HEALTH CARE EDUCATION/TRAINING PROGRAM

## 2025-02-05 PROCEDURE — 3078F DIAST BP <80 MM HG: CPT | Performed by: STUDENT IN AN ORGANIZED HEALTH CARE EDUCATION/TRAINING PROGRAM

## 2025-02-05 RX ORDER — CYCLOBENZAPRINE HCL 10 MG
10 TABLET ORAL 3 TIMES DAILY PRN
Qty: 30 TABLET | Refills: 2 | Status: SHIPPED | OUTPATIENT
Start: 2025-02-05 | End: 2025-10-03

## 2025-02-05 ASSESSMENT — PAIN SCALES - GENERAL: PAINLEVEL_OUTOF10: 8
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
## 2025-02-13 ENCOUNTER — APPOINTMENT (OUTPATIENT)
Dept: RADIOLOGY | Facility: HOSPITAL | Age: 60
End: 2025-02-13
Payer: COMMERCIAL

## 2025-02-13 ENCOUNTER — HOSPITAL ENCOUNTER (EMERGENCY)
Facility: HOSPITAL | Age: 60
Discharge: HOME | End: 2025-02-13
Payer: COMMERCIAL

## 2025-02-13 VITALS
TEMPERATURE: 98.2 F | BODY MASS INDEX: 40.8 KG/M2 | OXYGEN SATURATION: 96 % | HEART RATE: 89 BPM | RESPIRATION RATE: 18 BRPM | DIASTOLIC BLOOD PRESSURE: 76 MMHG | HEIGHT: 59 IN | SYSTOLIC BLOOD PRESSURE: 174 MMHG | WEIGHT: 202.38 LBS

## 2025-02-13 DIAGNOSIS — M79.604 RIGHT LEG PAIN: Primary | ICD-10-CM

## 2025-02-13 PROCEDURE — 73562 X-RAY EXAM OF KNEE 3: CPT | Mod: RIGHT SIDE | Performed by: RADIOLOGY

## 2025-02-13 PROCEDURE — 93971 EXTREMITY STUDY: CPT | Performed by: RADIOLOGY

## 2025-02-13 PROCEDURE — 73562 X-RAY EXAM OF KNEE 3: CPT | Mod: RT

## 2025-02-13 PROCEDURE — 2500000001 HC RX 250 WO HCPCS SELF ADMINISTERED DRUGS (ALT 637 FOR MEDICARE OP): Performed by: SURGERY

## 2025-02-13 PROCEDURE — 99284 EMERGENCY DEPT VISIT MOD MDM: CPT | Mod: 25

## 2025-02-13 PROCEDURE — 93971 EXTREMITY STUDY: CPT

## 2025-02-13 RX ORDER — ACETAMINOPHEN 325 MG/1
650 TABLET ORAL ONCE
Status: COMPLETED | OUTPATIENT
Start: 2025-02-13 | End: 2025-02-13

## 2025-02-13 RX ADMIN — ACETAMINOPHEN 650 MG: 325 TABLET, FILM COATED ORAL at 05:40

## 2025-02-13 ASSESSMENT — COLUMBIA-SUICIDE SEVERITY RATING SCALE - C-SSRS
2. HAVE YOU ACTUALLY HAD ANY THOUGHTS OF KILLING YOURSELF?: NO
1. IN THE PAST MONTH, HAVE YOU WISHED YOU WERE DEAD OR WISHED YOU COULD GO TO SLEEP AND NOT WAKE UP?: NO
6. HAVE YOU EVER DONE ANYTHING, STARTED TO DO ANYTHING, OR PREPARED TO DO ANYTHING TO END YOUR LIFE?: NO

## 2025-02-13 ASSESSMENT — PAIN DESCRIPTION - LOCATION: LOCATION: LEG

## 2025-02-13 ASSESSMENT — PAIN DESCRIPTION - DESCRIPTORS: DESCRIPTORS: ACHING

## 2025-02-13 ASSESSMENT — PAIN SCALES - GENERAL: PAINLEVEL_OUTOF10: 8

## 2025-02-13 ASSESSMENT — PAIN DESCRIPTION - PROGRESSION: CLINICAL_PROGRESSION: NOT CHANGED

## 2025-02-13 ASSESSMENT — PAIN - FUNCTIONAL ASSESSMENT: PAIN_FUNCTIONAL_ASSESSMENT: 0-10

## 2025-02-13 ASSESSMENT — PAIN DESCRIPTION - ORIENTATION: ORIENTATION: RIGHT

## 2025-02-13 NOTE — ED PROVIDER NOTES
Chief Complaint   Patient presents with    calf pain     HPI:   Miri Tavarez is an 59 y.o. female presenting to the ED for chief complaint of right leg pain.  She explains since Friday she has developed pain in the lateral aspect of her right calf.  Patient reports she took a step wrong at work and has developed pain ever since.  She does have known osteoarthritis in her right knee.  She denies falls numbness or tingling.  Not on blood thinners.  No injury.  No fever chills or sweats.  No chest pain or shortness of breath.  No abdominal pain NVD.  No head injuries.      No Known Allergies:  Past Medical History:   Diagnosis Date    Acute vaginitis 08/22/2018    Acute vaginitis    Encounter for gynecological examination (general) (routine) without abnormal findings 09/18/2016    Visit for routine gyn exam    Localized edema 04/24/2017    Lower leg edema    Otalgia, left ear 04/27/2017    Otalgia, left    Pain in left shoulder 09/21/2016    Pain in shoulder region, left    Personal history of other diseases of the nervous system and sense organs 02/09/2018    History of drainage from eye    Personal history of other diseases of the respiratory system 02/01/2018    History of acute pharyngitis    Personal history of other infectious and parasitic diseases 08/17/2018    History of dermatophytosis    Reaction to severe stress, unspecified 03/14/2016    Situational stress    Shortness of breath 05/08/2018    SOB (shortness of breath) on exertion    Sprain of unspecified ligament of right ankle, initial encounter 03/14/2016    Right ankle sprain    Strain of unspecified muscle, fascia and tendon at shoulder and upper arm level, right arm, initial encounter 04/24/2017    Right shoulder strain    Unspecified acute conjunctivitis, left eye 02/09/2018    Acute bacterial conjunctivitis of left eye     Past Surgical History:   Procedure Laterality Date    OTHER SURGICAL HISTORY  07/31/2020    No history of surgery     Family  History   Problem Relation Name Age of Onset    Arthritis Mother          Physical Exam  Vitals and nursing note reviewed.   Constitutional:       General: She is not in acute distress.     Appearance: She is well-developed.   HENT:      Head: Normocephalic and atraumatic.      Right Ear: Tympanic membrane normal.      Left Ear: Tympanic membrane normal.      Nose: Nose normal.      Mouth/Throat:      Mouth: Mucous membranes are moist.      Pharynx: Oropharynx is clear.   Eyes:      Extraocular Movements: Extraocular movements intact.      Conjunctiva/sclera: Conjunctivae normal.      Pupils: Pupils are equal, round, and reactive to light.   Cardiovascular:      Rate and Rhythm: Normal rate and regular rhythm.      Heart sounds: No murmur heard.  Pulmonary:      Effort: Pulmonary effort is normal. No respiratory distress.      Breath sounds: Normal breath sounds.   Abdominal:      Palpations: Abdomen is soft.      Tenderness: There is no abdominal tenderness.   Musculoskeletal:         General: No swelling.      Cervical back: Neck supple.      Comments: Mild pitting edema right lower extremity   Skin:     General: Skin is warm and dry.      Capillary Refill: Capillary refill takes less than 2 seconds.   Neurological:      General: No focal deficit present.      Mental Status: She is alert and oriented to person, place, and time.   Psychiatric:         Mood and Affect: Mood normal.        VS: As documented in the triage note and EMR flowsheet from this visit were reviewed.      Medical Decision Making: Is a 59-year-old female presenting to the ED for chief complaint of right lower extremity pain after she stepped incorrectly on the leg.  Her pain is over the lateral aspect of the distal leg.  She does have mild pitting edema no erythema.  Her tenderness is localized to the lateral aspect of the leg no medial tenderness.  She has a steady gait.  She has full range of motion of the knee good stability.  Tender over  the lateral distal leg.  X-ray was reassuring.  She had a duplex ultrasound that was negative for DVT.  She was medicated with Tylenol and relieved by her results.  She is appropriate for outpatient management ambulated out of the ED.  She understands strict return precautions will follow-up with the PCP.  Diagnoses as of 02/13/25 1953   Right leg pain     Counseling: Spoke with the patient and discussed today´s findings, in addition to providing specific details for the plan of care and expected course.  Patient was given the opportunity to ask questions.    Discussed return precautions and importance of follow-up.  Advised to follow-up with PCP.  I specifically advised to return to the ED for changing or worsening symptoms, new symptoms, complaint specific precautions, and precautions listed on the discharge paperwork.  Educated on the common potential side effects of medications prescribed.    I advised the patient that the emergency evaluation and treatment provided today doesn't end their need for medical care. It is very important that they follow-up with their primary care provider or other specialist as instructed.    The plan of care was mutually agreed upon with the patient. The patient and/or family were given the opportunity to ask questions. All questions asked today in the ED were answered to the best of my ability with today's information.    This report was transcribed using voice recognition software.  Every effort was made to ensure accuracy, however, inadvertently computerized transcription errors may be present.       Winston Randolph PA-C  02/13/25 0506

## 2025-02-13 NOTE — Clinical Note
Miri Tavarez was seen and treated in our emergency department on 2/13/2025.  She may return to work on 02/16/2025.       If you have any questions or concerns, please don't hesitate to call.      Winston Randolph PA-C

## 2025-02-17 ENCOUNTER — APPOINTMENT (OUTPATIENT)
Dept: ORTHOPEDIC SURGERY | Facility: CLINIC | Age: 60
End: 2025-02-17
Payer: COMMERCIAL

## 2025-02-17 DIAGNOSIS — M17.11 PRIMARY OSTEOARTHRITIS OF RIGHT KNEE: Primary | ICD-10-CM

## 2025-02-17 DIAGNOSIS — M17.11 ARTHRITIS OF KNEE, RIGHT: ICD-10-CM

## 2025-02-17 PROCEDURE — 1036F TOBACCO NON-USER: CPT | Performed by: FAMILY MEDICINE

## 2025-02-17 PROCEDURE — 99204 OFFICE O/P NEW MOD 45 MIN: CPT | Performed by: FAMILY MEDICINE

## 2025-02-17 RX ORDER — MELOXICAM 15 MG/1
15 TABLET ORAL DAILY
Qty: 30 TABLET | Refills: 0 | Status: SHIPPED | OUTPATIENT
Start: 2025-02-17

## 2025-02-17 NOTE — PROGRESS NOTES
History of Present Illness   Chief Complaint   Patient presents with    Right Knee - Pain     FOR 3 WEEKS  NKI       The patient is 59 y.o. female  here with a complaint of mostly right knee pain.  Referred by emergency medicine provider Winston Randolph. Initial onset of symptoms around 3 weeks ago, atraumatic in nature, says she woke up 1 morning with some discomfort in her right lateral leg/knee.  She did not seek any medical attention, says she wrapped it with an Ace bandage for a few days and seemed to improve.  Last week she had some recurrence of pain after stepping wrong.  Had some worsening pain over the course of that day to the point where she was not able to sleep that night, she was seen in the emergency department where she had an ultrasound of her right lower extremity obtained that was negative for DVT, she had x-rays of her right knee that showed some mild degenerative changes.  She was discharged home and recommended outpatient orthopedic follow-up she has seen some improvement in symptoms over the past few days, she points to the lateral aspect of her knee as area of discomfort, symptoms are exacerbated by walking, she has difficulty going up stairs specifically leading with her right leg.  Patient has a history of some prior lower leg pain, had remote stress fracture in her lower leg years ago.  Per chart review she has been seen for some right knee pain but no specific treatment aside from activity modification, over-the-counter medications, history of any knee injections.  Patient works for [employer] in the [employer] department, says she sits majority of the day.    Past Medical History:   Diagnosis Date    Acute vaginitis 08/22/2018    Acute vaginitis    Encounter for gynecological examination (general) (routine) without abnormal findings 09/18/2016    Visit for routine gyn exam    Localized edema 04/24/2017    Lower leg edema    Otalgia, left ear 04/27/2017    Otalgia, left    Pain in left  shoulder 09/21/2016    Pain in shoulder region, left    Personal history of other diseases of the nervous system and sense organs 02/09/2018    History of drainage from eye    Personal history of other diseases of the respiratory system 02/01/2018    History of acute pharyngitis    Personal history of other infectious and parasitic diseases 08/17/2018    History of dermatophytosis    Reaction to severe stress, unspecified 03/14/2016    Situational stress    Shortness of breath 05/08/2018    SOB (shortness of breath) on exertion    Sprain of unspecified ligament of right ankle, initial encounter 03/14/2016    Right ankle sprain    Strain of unspecified muscle, fascia and tendon at shoulder and upper arm level, right arm, initial encounter 04/24/2017    Right shoulder strain    Unspecified acute conjunctivitis, left eye 02/09/2018    Acute bacterial conjunctivitis of left eye       Medication Documentation Review Audit       Reviewed by Carmen Ty MA (Medical Assistant) on 02/17/25 at 0947      Medication Order Taking? Sig Documenting Provider Last Dose Status   amLODIPine (Norvasc) 5 mg tablet [id number] Yes TAKE 1 TABLET BY MOUTH EVERY DAY Manolo Tinoco MD  Active   cholecalciferol (Vitamin D-3) 1,250 mcg (50,000 unit) capsule [id number] No Take 1 capsule (50,000 Units) by mouth. Historical Provider, MD Taking Active   clindamycin (Cleocin T) 1 % gel [id number] No Apply topically once daily in the morning. Apply to full face. Historical Provider, MD Taking Active   cyclobenzaprine (Flexeril) 10 mg tablet [id number]  Take 1 tablet (10 mg) by mouth 3 times a day as needed for muscle spasms. Manolo Tinoco MD  Active   hydroCHLOROthiazide (HYDRODiuril) 25 mg tablet [id number] Yes TAKE 1 TABLET BY MOUTH EVERY DAY Manolo Tinoco MD  Active   hydrocortisone 2.5 % cream [id number]  Apply twice daily to affected areas of face (avoid eyes) for 2 weeks, then taper to twice daily on weekends only; repeat every 6-8  weeks as needed for flares Blayne Frank MD  Active   ketoconazole (NIZOral) 2 % cream [id number]  Apply twice daily to affected areas of face Blayne Frank MD  Active   ketoconazole (NIZOral) 2 % shampoo [id number]  Wash affected areas of scalp 2-3 times weekly as directed Blayne Frank MD  Active   lisinopril 20 mg tablet [id number]  Take 1 tablet (20 mg) by mouth once daily. Manolo Tinoco MD   24 [id number]   naltrexone-bupropion (Contrave) 8-90 mg ER tablet [id number]  Initiation: 1 tablet qAM days 1-7, then 1 tab BID days 8-14, then 2 tabs in AM + 1 tab in PM days 15-22, then 2 tabs BID. Taniya Prather, APRN-CHAYO  Active                    No Known Allergies    Social History     Socioeconomic History    Marital status:      Spouse name: Not on file    Number of children: Not on file    Years of education: Not on file    Highest education level: Not on file   Occupational History    Not on file   Tobacco Use    Smoking status: Never    Smokeless tobacco: Never   Substance and Sexual Activity    Alcohol use: Never    Drug use: Never    Sexual activity: Not on file   Other Topics Concern    Not on file   Social History Narrative    Not on file     Social Drivers of Health     Financial Resource Strain: Not on file   Food Insecurity: Not on file   Transportation Needs: Not on file   Physical Activity: Not on file   Stress: Not on file   Social Connections: Not on file   Intimate Partner Violence: Not on file   Housing Stability: Not on file       Past Surgical History:   Procedure Laterality Date    OTHER SURGICAL HISTORY  2020    No history of surgery          Review of Systems   GENERAL: Negative  GI: Negative  MUSCULOSKELETAL: See HPI  SKIN: Negative  NEURO:  Negative     Physical Exam:    General/Constitutional: well appearing, no distress, appears stated age  HEENT: sclera clear  Respiratory: non labored breathing  Vascular: No edema, swelling or tenderness, except as noted in detailed  exam.  Integumentary: No impressive skin lesions present, except as noted in detailed exam.  Neurological:  Alert and oriented   Psychological:  Normal mood and affect.  Musculoskeletal: Normal, except as noted in detailed exam and in HPI.  Mildly antalgic gait, unassisted      Right knee: Normal appearance, no swelling, no skin changes, no redness warmth.  There is no joint effusion present.  There is some focal anterior lateral joint tenderness to palpation.  Nontender at the biceps femoris tendon or the insertion on the posterior proximal fibula.  There is no tenderness in the popliteal fossa.  No other areas of tenderness to the patient at the knee.  She has good range of motion from 0 to 120 degrees, there are some mild discomfort at endrange of flexion.  No motor deficits are present at the knee.  Negative Mack, negative Lachman, negative posterior drawer.  Stable to varus valgus stress.    Right lower leg is normal in appearance, lower leg compartments are soft and compressible, there is no areas of tenderness to palpation     Imaging: X-rays of right knee from 2/13/2025 independently reviewed, there is mild degenerative changes with some tricompartmental joint space narrowing    Assessment   1. Primary osteoarthritis of right knee        2. Arthritis of knee, right  meloxicam (Mobic) 15 mg tablet            Plan: Right knee pain, atraumatic in nature secondary to underlying osteoarthritis which is mild on x-ray, she has some focal lateral joint tenderness to palpation.  Discussed further workup and treatment.  Patient interested in conservative management.  I did send a prescription in for meloxicam to assist in pain control, she was given a home exercise rehabilitation program to begin.  She can advance activities as tolerated by symptoms.  She will plan to follow-up as symptoms dictate.

## 2025-02-17 NOTE — LETTER
February 17, 2025     Winston Randolph PA-C  [address]  [facility] [address]    Patient: Miri Tavarez   YOB: 1965   Date of Visit: 2/17/2025       Dear Dr. Winston Randolph PA-C:    Thank you for referring Miri Tavarez to me for evaluation. Below are my notes for this consultation.  If you have questions, please do not hesitate to call me. I look forward to following your patient along with you.       Sincerely,     Ayush Moreno MD      CC: No Recipients  ______________________________________________________________________________________      History of Present Illness   Chief Complaint   Patient presents with   • Right Knee - Pain     FOR 3 WEEKS  NKI       The patient is 59 y.o. female  here with a complaint of mostly right knee pain.  Referred by emergency medicine provider Winston Randolph. Initial onset of symptoms around 3 weeks ago, atraumatic in nature, says she woke up 1 morning with some discomfort in her right lateral leg/knee.  She did not seek any medical attention, says she wrapped it with an Ace bandage for a few days and seemed to improve.  Last week she had some recurrence of pain after stepping wrong.  Had some worsening pain over the course of that day to the point where she was not able to sleep that night, she was seen in the emergency department where she had an ultrasound of her right lower extremity obtained that was negative for DVT, she had x-rays of her right knee that showed some mild degenerative changes.  She was discharged home and recommended outpatient orthopedic follow-up she has seen some improvement in symptoms over the past few days, she points to the lateral aspect of her knee as area of discomfort, symptoms are exacerbated by walking, she has difficulty going up stairs specifically leading with her right leg.  Patient has a history of some prior lower leg pain, had remote stress fracture in her lower leg years ago.  Per chart review she has been seen for some  right knee pain but no specific treatment aside from activity modification, over-the-counter medications, history of any knee injections.  Patient works for [employer] in the [employer] department, says she sits majority of the day.    Past Medical History:   Diagnosis Date   • Acute vaginitis 08/22/2018    Acute vaginitis   • Encounter for gynecological examination (general) (routine) without abnormal findings 09/18/2016    Visit for routine gyn exam   • Localized edema 04/24/2017    Lower leg edema   • Otalgia, left ear 04/27/2017    Otalgia, left   • Pain in left shoulder 09/21/2016    Pain in shoulder region, left   • Personal history of other diseases of the nervous system and sense organs 02/09/2018    History of drainage from eye   • Personal history of other diseases of the respiratory system 02/01/2018    History of acute pharyngitis   • Personal history of other infectious and parasitic diseases 08/17/2018    History of dermatophytosis   • Reaction to severe stress, unspecified 03/14/2016    Situational stress   • Shortness of breath 05/08/2018    SOB (shortness of breath) on exertion   • Sprain of unspecified ligament of right ankle, initial encounter 03/14/2016    Right ankle sprain   • Strain of unspecified muscle, fascia and tendon at shoulder and upper arm level, right arm, initial encounter 04/24/2017    Right shoulder strain   • Unspecified acute conjunctivitis, left eye 02/09/2018    Acute bacterial conjunctivitis of left eye       Medication Documentation Review Audit       Reviewed by Carmen Ty MA (Medical Assistant) on 02/17/25 at 0947      Medication Order Taking? Sig Documenting Provider Last Dose Status   amLODIPine (Norvasc) 5 mg tablet [id number] Yes TAKE 1 TABLET BY MOUTH EVERY DAY Manolo Tinoco MD  Active   cholecalciferol (Vitamin D-3) 1,250 mcg (50,000 unit) capsule [id number] No Take 1 capsule (50,000 Units) by mouth. Historical Provider, MD Taking Active   clindamycin  (Cleocin T) 1 % gel [id number] No Apply topically once daily in the morning. Apply to full face. Historical Provider, MD Taking Active   cyclobenzaprine (Flexeril) 10 mg tablet [id number]  Take 1 tablet (10 mg) by mouth 3 times a day as needed for muscle spasms. Manolo Tinoco MD  Active   hydroCHLOROthiazide (HYDRODiuril) 25 mg tablet [id number] Yes TAKE 1 TABLET BY MOUTH EVERY DAY Manolo Tinoco MD  Active   hydrocortisone 2.5 % cream [id number]  Apply twice daily to affected areas of face (avoid eyes) for 2 weeks, then taper to twice daily on weekends only; repeat every 6-8 weeks as needed for flares Blayne Frank MD  Active   ketoconazole (NIZOral) 2 % cream [id number]  Apply twice daily to affected areas of face Blayne Frank MD  Active   ketoconazole (NIZOral) 2 % shampoo [id number]  Wash affected areas of scalp 2-3 times weekly as directed Blayne Frank MD  Active   lisinopril 20 mg tablet [id number]  Take 1 tablet (20 mg) by mouth once daily. Manolo Tinoco MD   24 [id number]   naltrexone-bupropion (Contrave) 8-90 mg ER tablet [id number]  Initiation: 1 tablet qAM days 1-7, then 1 tab BID days 8-14, then 2 tabs in AM + 1 tab in PM days 15-22, then 2 tabs BID. Taniya Prather, APRN-CNP  Active                    No Known Allergies    Social History     Socioeconomic History   • Marital status:      Spouse name: Not on file   • Number of children: Not on file   • Years of education: Not on file   • Highest education level: Not on file   Occupational History   • Not on file   Tobacco Use   • Smoking status: Never   • Smokeless tobacco: Never   Substance and Sexual Activity   • Alcohol use: Never   • Drug use: Never   • Sexual activity: Not on file   Other Topics Concern   • Not on file   Social History Narrative   • Not on file     Social Drivers of Health     Financial Resource Strain: Not on file   Food Insecurity: Not on file   Transportation Needs: Not on file   Physical Activity: Not on file    Stress: Not on file   Social Connections: Not on file   Intimate Partner Violence: Not on file   Housing Stability: Not on file       Past Surgical History:   Procedure Laterality Date   • OTHER SURGICAL HISTORY  07/31/2020    No history of surgery          Review of Systems   GENERAL: Negative  GI: Negative  MUSCULOSKELETAL: See HPI  SKIN: Negative  NEURO:  Negative     Physical Exam:    General/Constitutional: well appearing, no distress, appears stated age  HEENT: sclera clear  Respiratory: non labored breathing  Vascular: No edema, swelling or tenderness, except as noted in detailed exam.  Integumentary: No impressive skin lesions present, except as noted in detailed exam.  Neurological:  Alert and oriented   Psychological:  Normal mood and affect.  Musculoskeletal: Normal, except as noted in detailed exam and in HPI.  Mildly antalgic gait, unassisted      Right knee: Normal appearance, no swelling, no skin changes, no redness warmth.  There is no joint effusion present.  There is some focal anterior lateral joint tenderness to palpation.  Nontender at the biceps femoris tendon or the insertion on the posterior proximal fibula.  There is no tenderness in the popliteal fossa.  No other areas of tenderness to the patient at the knee.  She has good range of motion from 0 to 120 degrees, there are some mild discomfort at endrange of flexion.  No motor deficits are present at the knee.  Negative Mack, negative Lachman, negative posterior drawer.  Stable to varus valgus stress.    Right lower leg is normal in appearance, lower leg compartments are soft and compressible, there is no areas of tenderness to palpation     Imaging: X-rays of right knee from 2/13/2025 independently reviewed, there is mild degenerative changes with some tricompartmental joint space narrowing    Assessment   1. Primary osteoarthritis of right knee        2. Arthritis of knee, right  meloxicam (Mobic) 15 mg tablet            Plan: Right  knee pain, atraumatic in nature secondary to underlying osteoarthritis which is mild on x-ray, she has some focal lateral joint tenderness to palpation.  Discussed further workup and treatment.  Patient interested in conservative management.  I did send a prescription in for meloxicam to assist in pain control, she was given a home exercise rehabilitation program to begin.  She can advance activities as tolerated by symptoms.  She will plan to follow-up as symptoms dictate.

## 2025-02-25 DIAGNOSIS — I10 HYPERTENSION, UNSPECIFIED TYPE: ICD-10-CM

## 2025-02-25 RX ORDER — HYDROCHLOROTHIAZIDE 25 MG/1
TABLET ORAL
Qty: 90 TABLET | Refills: 0 | Status: SHIPPED | OUTPATIENT
Start: 2025-02-25

## 2025-03-05 ENCOUNTER — APPOINTMENT (OUTPATIENT)
Dept: RHEUMATOLOGY | Facility: CLINIC | Age: 60
End: 2025-03-05
Payer: COMMERCIAL

## 2025-03-11 ENCOUNTER — APPOINTMENT (OUTPATIENT)
Dept: ENDOCRINOLOGY | Facility: CLINIC | Age: 60
End: 2025-03-11
Payer: COMMERCIAL

## 2025-03-11 VITALS — BODY MASS INDEX: 40.32 KG/M2 | HEIGHT: 59 IN | WEIGHT: 200 LBS

## 2025-03-11 DIAGNOSIS — Z71.3 DIETARY COUNSELING: Primary | ICD-10-CM

## 2025-03-11 DIAGNOSIS — E66.813 CLASS 3 SEVERE OBESITY WITHOUT SERIOUS COMORBIDITY WITH BODY MASS INDEX (BMI) OF 40.0 TO 44.9 IN ADULT, UNSPECIFIED OBESITY TYPE: ICD-10-CM

## 2025-03-11 DIAGNOSIS — E66.01 CLASS 3 SEVERE OBESITY WITHOUT SERIOUS COMORBIDITY WITH BODY MASS INDEX (BMI) OF 40.0 TO 44.9 IN ADULT, UNSPECIFIED OBESITY TYPE: ICD-10-CM

## 2025-03-11 PROCEDURE — 97802 MEDICAL NUTRITION INDIV IN: CPT | Performed by: DIETITIAN, REGISTERED

## 2025-03-11 NOTE — PROGRESS NOTES
"Initial Nutrition Assessment    Patient was referred to nutrition by NASRA Murphy  for weight management/desire to lose weight. Other PMHX significant for HTN, Pre-DM, Arthritis and OA of the knee.     Diet recall reveals an inconsistent meal pattern with some missed meals, as well as inconsistent intakes of both lean protein and high fiber food sources, all likely contributing to lack of desired weight loss/contributing to weight gain over time. Fluids are currently not meeting recommendations, and likely contributing to dehydration. Pt is not incorporating consistent physical activity at this time and would likely benefit from increased structured activity to assist in achieving goals. See all interventions/recommendations below as discussed during visit this day.      Patient reported symptoms: Difficulty losing weight    Anthropometrics:  Height:   Ht Readings from Last 1 Encounters:   02/13/25 1.499 m (4' 11\")      Weight:   Wt Readings from Last 10 Encounters:   02/13/25 91.8 kg (202 lb 6.1 oz)   02/05/25 89.4 kg (197 lb)   01/08/25 93.4 kg (206 lb)   12/17/24 95.3 kg (210 lb)   07/10/24 100 kg (220 lb 7.4 oz)   11/09/23 100 kg (221 lb)   10/09/23 94 kg (207 lb 3.7 oz)   09/13/23 93 kg (205 lb 0.6 oz)   06/22/23 95.7 kg (211 lb)   05/25/23 93.9 kg (207 lb)      Current BMI:   BMI Readings from Last 1 Encounters:   02/13/25 40.88 kg/m²        Labs:  Lab Results   Component Value Date    HGBA1C 5.9 01/08/2025      Lab Results   Component Value Date    CHOL 230 (H) 11/11/2023    CHOL 198 08/31/2021     Lab Results   Component Value Date    HDL 88.0 11/11/2023    HDL 56.9 08/31/2021     Lab Results   Component Value Date    LDLCALC 98 11/11/2023     Lab Results   Component Value Date    TRIG 218 (H) 11/11/2023    TRIG 124 08/31/2021       Malnutrition Screening:  Significant Unintentional weight loss: No  Eating less than 75% of usual intake for more than 2 weeks: No  Potential Signs of Inflammation: " No    Recommended Malnutrition Diagnosis: No malnutrition identified    Diet Recall-  Breakfast- turkey sausage OR turkey robles with 2 HB eggs  AM snack- fruit  Lunch- salad with various vegetables and robles   Dinner- none typically  Snacks- fruit  Beverages- water (16 ounces daily), 1 regular soda occasionally   Alcohol- none   Physical Activity- none at this time     Other pertinent patient reported Information:  - Pt was taking medication Contrave but stopped it last week due to it not making her feel well (bad headaches)    Nutrition Diagnosis: Food and nutrition-related knowledge deficit related to lack of prior exposure to information as evidenced by verbalizes inaccurate or incomplete information.    Readiness to Learn:  Cognitive ability: Alert and oriented  Motivation to learn: Interested  Family Support: Unable to assess- family not present  Instruction provided to: Patient  Patient learns best by: Multiple methods  Factors affecting learning: None   Physical limitations affecting learning: None    Education Materials Provided:   Your Mediterranean Meal Plan Booklet    Nutrition Interventions/Recommendations for 3/11/2025:  - Please refer to your book entitled: Your Mediterranean Meal Plan, and follow Mediterranean Diet eating guidelines as reviewed.  - The Healthy Plate style of eating can be a helpful tool for incorporating healthy balanced meals in appropriate portions. (Healthy Plate: Start with a 9-inch diameter plate. Fill 1/2 the plate with non-starchy vegetables, 1/4 of the plate with whole grains or starchy vegetables, and 1/4 of the plate with a lean source of protein.   - Please aim for a source of healthy protein and fiber rich foods at meals as discussed for nutrition needs as well as to help provide better satiety at meals.   - Consider pre-planning healthy meals for the week. Refer to your book for both menu and recipe ideas to get you started.  - Aim for 48 ounces of water daily.  -  Continue to aim for strategies to encourage exercise at the gym as discussed.  - Follow-up as scheduled for the group classes in April.      Nutrition Monitoring & Evaluation: adherence to recommendations and patient stated goals    Need for follow-up:  April [facility]    Referred by: YURI Murphy-CNP     MNT Billing Type: Medical Nutrition Assessment, each 15 min increment, for 2 increments.    SIGNATURE:   Nicole Reeves RD, COTY, LD, CDCES                                                        DATE:   3/11/2025

## 2025-03-11 NOTE — PATIENT INSTRUCTIONS
- Please refer to your book entitled: Your Mediterranean Meal Plan, and follow Mediterranean Diet eating guidelines as reviewed.  - The Healthy Plate style of eating can be a helpful tool for incorporating healthy balanced meals in appropriate portions. (Healthy Plate: Start with a 9-inch diameter plate. Fill 1/2 the plate with non-starchy vegetables, 1/4 of the plate with whole grains or starchy vegetables, and 1/4 of the plate with a lean source of protein.   - Please aim for a source of healthy protein and fiber rich foods at meals as discussed for nutrition needs as well as to help provide better satiety at meals.   - Consider pre-planning healthy meals for the week. Refer to your book for both menu and recipe ideas to get you started.  - Aim for 48 ounces of water daily.  - Continue to aim for strategies to encourage exercise at the gym as discussed.  - Follow-up as scheduled for the group classes in April.

## 2025-03-25 ENCOUNTER — EVALUATION (OUTPATIENT)
Dept: PHYSICAL THERAPY | Facility: CLINIC | Age: 60
End: 2025-03-25
Payer: MEDICARE

## 2025-03-25 DIAGNOSIS — M25.562 LEFT KNEE PAIN: ICD-10-CM

## 2025-03-25 DIAGNOSIS — M25.561 RIGHT KNEE PAIN: ICD-10-CM

## 2025-03-25 DIAGNOSIS — M25.561 CHRONIC PAIN OF RIGHT KNEE: ICD-10-CM

## 2025-03-25 DIAGNOSIS — M54.9 CHRONIC UPPER BACK PAIN: ICD-10-CM

## 2025-03-25 DIAGNOSIS — G89.29 CHRONIC UPPER BACK PAIN: ICD-10-CM

## 2025-03-25 DIAGNOSIS — G89.29 CHRONIC PAIN OF RIGHT KNEE: ICD-10-CM

## 2025-03-25 DIAGNOSIS — G89.29 CHRONIC LOW BACK PAIN: Primary | ICD-10-CM

## 2025-03-25 DIAGNOSIS — M25.69 BACK STIFFNESS: ICD-10-CM

## 2025-03-25 DIAGNOSIS — M54.50 CHRONIC LOW BACK PAIN: Primary | ICD-10-CM

## 2025-03-25 PROCEDURE — 97161 PT EVAL LOW COMPLEX 20 MIN: CPT | Mod: GP | Performed by: PHYSICAL THERAPIST

## 2025-03-25 PROCEDURE — 97110 THERAPEUTIC EXERCISES: CPT | Mod: GP | Performed by: PHYSICAL THERAPIST

## 2025-03-25 ASSESSMENT — ENCOUNTER SYMPTOMS
LOSS OF SENSATION IN FEET: 0
DEPRESSION: 0
OCCASIONAL FEELINGS OF UNSTEADINESS: 1
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
## 2025-03-31 ENCOUNTER — APPOINTMENT (OUTPATIENT)
Dept: DERMATOLOGY | Facility: CLINIC | Age: 60
End: 2025-03-31
Payer: COMMERCIAL

## 2025-04-09 ENCOUNTER — TREATMENT (OUTPATIENT)
Dept: PHYSICAL THERAPY | Facility: CLINIC | Age: 60
End: 2025-04-09
Payer: MEDICARE

## 2025-04-09 DIAGNOSIS — M25.562 LEFT KNEE PAIN: ICD-10-CM

## 2025-04-09 DIAGNOSIS — M54.50 CHRONIC LOW BACK PAIN: ICD-10-CM

## 2025-04-09 DIAGNOSIS — G89.29 CHRONIC LOW BACK PAIN: ICD-10-CM

## 2025-04-09 PROCEDURE — 97110 THERAPEUTIC EXERCISES: CPT | Mod: GP,CQ

## 2025-04-09 ASSESSMENT — PAIN - FUNCTIONAL ASSESSMENT: PAIN_FUNCTIONAL_ASSESSMENT: 0-10

## 2025-04-09 ASSESSMENT — PAIN SCALES - GENERAL: PAINLEVEL_OUTOF10: 5 - MODERATE PAIN

## 2025-04-09 NOTE — PROGRESS NOTES
Physical Therapy    Physical Therapy Treatment    Patient Name: Miri Tavarez  MRN: [id number]  Today's Date: 4/9/2025  Time Entry:   Time Calculation  Start Time: 0815  Stop Time: 0900  Time Calculation (min): 45 min     PT Therapeutic Procedures Time Entry  Therapeutic Exercise Time Entry: 45                 Visit: 2  Insurance: Reviewed  Physician: Manolo Tinoco    Assessment:  PT Assessment  Evaluation/Treatment Tolerance: Patient tolerated treatment well  Good/quick understanding of therex. No reports of painful increase today. Review of HEP and use of ankle weight at home. Finishes session currently having 0/10 pain, educated on icing.     Plan:   Continue with POC  General fitness, back stretches, knee ROM/strength, PRE's, CKC, GT including stairs, CP    OP PT Plan  PT Plan: Skilled PT  PT Frequency: 1 time per week  Duration: 6    Current Problem  1. Chronic low back pain  Follow Up In Physical Therapy      2. Left knee pain  Follow Up In Physical Therapy      General        Subjective    The back has been good, no current complaints  Right knee pain rated at a 5/10, it can be dull and sharp  When the pain is sharp my quad/knee feels like it will give vout  Aav done at least a couple of my exercises every day    Pain  Pain Assessment  Pain Assessment: 0-10  0-10 (Numeric) Pain Score: 5 - Moderate pain    Objective     Treatments:  Therapeutic Exercise  Therapeutic Exercise Performed: Yes  Stepper x 5 min  Lunge stretch on stepper x 20  Heel raises x 20  Step calf stretching 2 x 30 sec  Seated hamstring stretch 2 x 30 sec  Seated LAQ 2# x 20  Seated curl GTB x 20  Bridge with hip adduction x 20  Hip abduction Gtb x 20  SLR with quad set x 20    OP EDUCATION:       Goals:  Active       PT Problem       PT Goal 1       Start:  03/25/25    Expected End:  06/23/25       Knee pain 2/10 or less           PT Goal 2       Start:  03/25/25    Expected End:  06/23/25       Improve LEFS by 30%         PT Goal 3        Start:  03/25/25    Expected End:  06/23/25       Normal gait  Steps reciprocal with good form          PT Goal 4       Start:  03/25/25    Expected End:  06/23/25       0 to 125 right knee ROM          PT Goal 5       Start:  03/25/25    Expected End:  06/23/25       5/5 knee strength

## 2025-04-10 ENCOUNTER — APPOINTMENT (OUTPATIENT)
Dept: ENDOCRINOLOGY | Facility: CLINIC | Age: 60
End: 2025-04-10
Payer: COMMERCIAL

## 2025-04-15 ENCOUNTER — APPOINTMENT (OUTPATIENT)
Dept: PHYSICAL THERAPY | Facility: CLINIC | Age: 60
End: 2025-04-15
Payer: MEDICARE

## 2025-04-16 ENCOUNTER — APPOINTMENT (OUTPATIENT)
Dept: PHYSICAL THERAPY | Facility: CLINIC | Age: 60
End: 2025-04-16
Payer: MEDICARE

## 2025-04-22 ENCOUNTER — APPOINTMENT (OUTPATIENT)
Dept: PRIMARY CARE | Facility: CLINIC | Age: 60
End: 2025-04-22
Payer: COMMERCIAL

## 2025-04-23 ENCOUNTER — APPOINTMENT (OUTPATIENT)
Dept: PHYSICAL THERAPY | Facility: CLINIC | Age: 60
End: 2025-04-23
Payer: MEDICARE

## 2025-04-24 DIAGNOSIS — I10 HYPERTENSION, UNSPECIFIED TYPE: ICD-10-CM

## 2025-04-25 RX ORDER — HYDROCHLOROTHIAZIDE 25 MG/1
TABLET ORAL
Qty: 90 TABLET | Refills: 0 | Status: SHIPPED | OUTPATIENT
Start: 2025-04-25

## 2025-04-30 ENCOUNTER — APPOINTMENT (OUTPATIENT)
Dept: PHYSICAL THERAPY | Facility: CLINIC | Age: 60
End: 2025-04-30
Payer: MEDICARE

## 2025-05-25 DIAGNOSIS — M17.11 ARTHRITIS OF KNEE, RIGHT: ICD-10-CM

## 2025-05-27 RX ORDER — MELOXICAM 15 MG/1
15 TABLET ORAL DAILY
Qty: 30 TABLET | Refills: 1 | Status: SHIPPED | OUTPATIENT
Start: 2025-05-27

## 2025-06-12 ENCOUNTER — APPOINTMENT (OUTPATIENT)
Dept: ENDOCRINOLOGY | Facility: CLINIC | Age: 60
End: 2025-06-12
Payer: COMMERCIAL

## 2025-07-03 ENCOUNTER — APPOINTMENT (OUTPATIENT)
Dept: PRIMARY CARE | Facility: CLINIC | Age: 60
End: 2025-07-03
Payer: COMMERCIAL

## 2025-07-03 VITALS
OXYGEN SATURATION: 93 % | HEART RATE: 66 BPM | DIASTOLIC BLOOD PRESSURE: 83 MMHG | HEIGHT: 59 IN | SYSTOLIC BLOOD PRESSURE: 159 MMHG | BODY MASS INDEX: 39.51 KG/M2 | TEMPERATURE: 96.6 F | WEIGHT: 196 LBS

## 2025-07-03 DIAGNOSIS — E66.812 CLASS 2 SEVERE OBESITY DUE TO EXCESS CALORIES WITH SERIOUS COMORBIDITY AND BODY MASS INDEX (BMI) OF 39.0 TO 39.9 IN ADULT: ICD-10-CM

## 2025-07-03 DIAGNOSIS — I10 PRIMARY HYPERTENSION: ICD-10-CM

## 2025-07-03 DIAGNOSIS — R73.03 PREDIABETES: ICD-10-CM

## 2025-07-03 DIAGNOSIS — I10 HYPERTENSION, UNSPECIFIED TYPE: ICD-10-CM

## 2025-07-03 DIAGNOSIS — Z00.00 HEALTHCARE MAINTENANCE: Primary | ICD-10-CM

## 2025-07-03 DIAGNOSIS — Z12.11 ENCOUNTER FOR SCREENING FOR MALIGNANT NEOPLASM OF COLON: ICD-10-CM

## 2025-07-03 DIAGNOSIS — E66.01 CLASS 2 SEVERE OBESITY DUE TO EXCESS CALORIES WITH SERIOUS COMORBIDITY AND BODY MASS INDEX (BMI) OF 39.0 TO 39.9 IN ADULT: ICD-10-CM

## 2025-07-03 DIAGNOSIS — Z12.31 BREAST CANCER SCREENING BY MAMMOGRAM: ICD-10-CM

## 2025-07-03 PROBLEM — M45.9 ANKYLOSING SPONDYLITIS: Status: RESOLVED | Noted: 2023-05-25 | Resolved: 2025-07-03

## 2025-07-03 PROCEDURE — 3008F BODY MASS INDEX DOCD: CPT | Performed by: STUDENT IN AN ORGANIZED HEALTH CARE EDUCATION/TRAINING PROGRAM

## 2025-07-03 PROCEDURE — 3079F DIAST BP 80-89 MM HG: CPT | Performed by: STUDENT IN AN ORGANIZED HEALTH CARE EDUCATION/TRAINING PROGRAM

## 2025-07-03 PROCEDURE — 1036F TOBACCO NON-USER: CPT | Performed by: STUDENT IN AN ORGANIZED HEALTH CARE EDUCATION/TRAINING PROGRAM

## 2025-07-03 PROCEDURE — 99396 PREV VISIT EST AGE 40-64: CPT | Performed by: STUDENT IN AN ORGANIZED HEALTH CARE EDUCATION/TRAINING PROGRAM

## 2025-07-03 PROCEDURE — 3077F SYST BP >= 140 MM HG: CPT | Performed by: STUDENT IN AN ORGANIZED HEALTH CARE EDUCATION/TRAINING PROGRAM

## 2025-07-03 RX ORDER — HYDROCHLOROTHIAZIDE 25 MG/1
25 TABLET ORAL DAILY
Qty: 90 TABLET | Refills: 3 | Status: SHIPPED | OUTPATIENT
Start: 2025-07-03 | End: 2026-07-03

## 2025-07-03 RX ORDER — AMLODIPINE BESYLATE 10 MG/1
10 TABLET ORAL DAILY
Qty: 90 TABLET | Refills: 3 | Status: SHIPPED | OUTPATIENT
Start: 2025-07-03

## 2025-07-03 ASSESSMENT — PATIENT HEALTH QUESTIONNAIRE - PHQ9
SUM OF ALL RESPONSES TO PHQ9 QUESTIONS 1 AND 2: 0
2. FEELING DOWN, DEPRESSED OR HOPELESS: NOT AT ALL
1. LITTLE INTEREST OR PLEASURE IN DOING THINGS: NOT AT ALL

## 2025-07-03 ASSESSMENT — PAIN SCALES - GENERAL: PAINLEVEL_OUTOF10: 0-NO PAIN

## 2025-07-03 NOTE — PATIENT INSTRUCTIONS
Please stop at any  lab ([address] if you choose to use my building) to complete your blood and/or urine work that I've ordered for you.    I will contact you with the results at my soonest convenience. I strongly urge you to use [employer] as this is the quickest and easiest way to access your results and receive my correspondences.      I have added or changed your medications today. See the medication section of this packet for full details.      Please check your blood pressures at home on a regular basis, and notify me if your pressures are consistently above 140/90.      I have ordered your mammogram today. Please call [phone] to schedule this at a convenient time and location. The nearest breast center to my office is at [facility].     I have ordered Cologuard to screen you for colon cancer. You will receive a kit at home.     Keep up the great work with the weight loss! Keep up with lifestyle modifications such as diet and exercise.     See me yearly for a complete physical exam, and sooner as needed for sick visits
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
## 2025-07-23 ENCOUNTER — APPOINTMENT (OUTPATIENT)
Dept: RADIOLOGY | Facility: CLINIC | Age: 60
End: 2025-07-23
Payer: COMMERCIAL

## 2025-07-23 VITALS — BODY MASS INDEX: 39.51 KG/M2 | WEIGHT: 195.99 LBS | HEIGHT: 59 IN

## 2025-07-23 DIAGNOSIS — Z12.31 BREAST CANCER SCREENING BY MAMMOGRAM: ICD-10-CM

## 2025-07-23 PROCEDURE — 77063 BREAST TOMOSYNTHESIS BI: CPT

## 2025-07-23 PROCEDURE — 77063 BREAST TOMOSYNTHESIS BI: CPT | Performed by: RADIOLOGY

## 2025-07-23 PROCEDURE — 77067 SCR MAMMO BI INCL CAD: CPT | Performed by: RADIOLOGY

## 2025-07-24 LAB
ALBUMIN SERPL-MCNC: 3.8 G/DL (ref 3.6–5.1)
ALP SERPL-CCNC: 57 U/L (ref 37–153)
ALT SERPL-CCNC: 15 U/L (ref 6–29)
ANION GAP SERPL CALCULATED.4IONS-SCNC: 7 MMOL/L (CALC) (ref 7–17)
AST SERPL-CCNC: 15 U/L (ref 10–35)
BASOPHILS # BLD AUTO: 41 CELLS/UL (ref 0–200)
BASOPHILS NFR BLD AUTO: 0.7 %
BILIRUB SERPL-MCNC: 0.3 MG/DL (ref 0.2–1.2)
BUN SERPL-MCNC: 10 MG/DL (ref 7–25)
CALCIUM SERPL-MCNC: 8.8 MG/DL (ref 8.6–10.4)
CHLORIDE SERPL-SCNC: 101 MMOL/L (ref 98–110)
CHOLEST SERPL-MCNC: 196 MG/DL
CHOLEST/HDLC SERPL: 2.8 (CALC)
CO2 SERPL-SCNC: 32 MMOL/L (ref 20–32)
CREAT SERPL-MCNC: 0.73 MG/DL (ref 0.5–1.03)
EGFRCR SERPLBLD CKD-EPI 2021: 95 ML/MIN/1.73M2
EOSINOPHIL # BLD AUTO: 122 CELLS/UL (ref 15–500)
EOSINOPHIL NFR BLD AUTO: 2.1 %
ERYTHROCYTE [DISTWIDTH] IN BLOOD BY AUTOMATED COUNT: 12.8 % (ref 11–15)
EST. AVERAGE GLUCOSE BLD GHB EST-MCNC: 128 MG/DL
EST. AVERAGE GLUCOSE BLD GHB EST-SCNC: 7.1 MMOL/L
GLUCOSE SERPL-MCNC: 142 MG/DL (ref 65–139)
HBA1C MFR BLD: 6.1 %
HCT VFR BLD AUTO: 40.4 % (ref 35–45)
HDLC SERPL-MCNC: 71 MG/DL
HGB BLD-MCNC: 13 G/DL (ref 11.7–15.5)
LDLC SERPL CALC-MCNC: 102 MG/DL (CALC)
LYMPHOCYTES # BLD AUTO: 1740 CELLS/UL (ref 850–3900)
LYMPHOCYTES NFR BLD AUTO: 30 %
MCH RBC QN AUTO: 30.3 PG (ref 27–33)
MCHC RBC AUTO-ENTMCNC: 32.2 G/DL (ref 32–36)
MCV RBC AUTO: 94.2 FL (ref 80–100)
MONOCYTES # BLD AUTO: 435 CELLS/UL (ref 200–950)
MONOCYTES NFR BLD AUTO: 7.5 %
NEUTROPHILS # BLD AUTO: 3463 CELLS/UL (ref 1500–7800)
NEUTROPHILS NFR BLD AUTO: 59.7 %
NONHDLC SERPL-MCNC: 125 MG/DL (CALC)
PLATELET # BLD AUTO: 375 THOUSAND/UL (ref 140–400)
PMV BLD REES-ECKER: 8.9 FL (ref 7.5–12.5)
POTASSIUM SERPL-SCNC: 4.3 MMOL/L (ref 3.5–5.3)
PROT SERPL-MCNC: 6.6 G/DL (ref 6.1–8.1)
RBC # BLD AUTO: 4.29 MILLION/UL (ref 3.8–5.1)
SODIUM SERPL-SCNC: 140 MMOL/L (ref 135–146)
TRIGL SERPL-MCNC: 136 MG/DL
WBC # BLD AUTO: 5.8 THOUSAND/UL (ref 3.8–10.8)

## 2025-07-30 ENCOUNTER — OFFICE VISIT (OUTPATIENT)
Dept: URGENT CARE | Age: 60
End: 2025-07-30
Payer: COMMERCIAL

## 2025-07-30 VITALS
HEART RATE: 95 BPM | RESPIRATION RATE: 19 BRPM | SYSTOLIC BLOOD PRESSURE: 141 MMHG | OXYGEN SATURATION: 98 % | TEMPERATURE: 98.3 F | DIASTOLIC BLOOD PRESSURE: 74 MMHG

## 2025-07-30 DIAGNOSIS — R21 RASH: Primary | ICD-10-CM

## 2025-07-30 PROCEDURE — 1036F TOBACCO NON-USER: CPT | Performed by: PHYSICIAN ASSISTANT

## 2025-07-30 PROCEDURE — 99203 OFFICE O/P NEW LOW 30 MIN: CPT | Performed by: PHYSICIAN ASSISTANT

## 2025-07-30 PROCEDURE — 3077F SYST BP >= 140 MM HG: CPT | Performed by: PHYSICIAN ASSISTANT

## 2025-07-30 PROCEDURE — 3078F DIAST BP <80 MM HG: CPT | Performed by: PHYSICIAN ASSISTANT

## 2025-07-30 NOTE — PROGRESS NOTES
Subjective:  Miri Tavarez is a 59 y.o. female in clinic today complaining of a red rash on the left lower leg. Noticed it this morning when she was putting lotion on her legs. Not raised or painful. Slightly itchy when touching it. No recent fever, chills, abdominal pain, myalgias or arthralgias. Pt denies recent medication changes. Pt denies any new soaps, lotions, laundry detergents, foods, etc. She was told it looks like shingles.    Review of Systems:  Pertinent items are noted in HPI.      Allergies:  Patient has no known allergies.      Current Medications:  Medications Ordered Prior to Encounter[1]      Past Medical History:  Medical History[2]    Objective:  /74   Pulse 95   Temp 36.8 °C (98.3 °F) (Oral)   Resp 19   SpO2 98%     General Appearance: Alert, cooperative, no distress, appropriate for age  Head: Normocephalic,without obvious abnormality  Eyes: Conjunctiva and cornea clear  Lungs: respirations unlabored  Heart: Regular rate & rhythm  Skin/Hair/Nails: Skin warm, dry. Left lower leg with erythematous macular non-blanching rash. No tenderness. No swelling or warmth. No raised lesions, vesicles or drainage.  Neurologic: Alert and oriented x3, no cranial nerve deficits, normal strength and tone, gait steady        ASSESSMENT/ PLAN:  1. Rash          Pt with erythematous non-blanching rash on the left lower leg, not bothersome   She is well appearing, afebrile, vitals stable  Discussed does not look like shingles or cellulitis, she has no pain  Discussed possible vasculitis vs contact derm vs other?  She can use OTC hydrocortisone if itchy  Recommend she follow up with her dermatologist/PCP      Libia Ma PA-C      I have given the patient instructions regarding her diagnosis, expectations, follow up, and return to the ER precautions. Iexplained to the patient that emergent conditions may arise to return to the immediate care or ER for new, worsening or any persistent conditions. I've  explained the importance of following up with her doctor- Manolo Tinoco MD - asinstructed. The patient verbalized understanding of the discharge instructions and plan.         [1]   Current Outpatient Medications on File Prior to Visit   Medication Sig Dispense Refill    amLODIPine (Norvasc) 10 mg tablet Take 1 tablet (10 mg) by mouth once daily. 90 tablet 3    cholecalciferol (Vitamin D-3) 1,250 mcg (50,000 unit) capsule Take 1 capsule (50,000 Units) by mouth.      cyclobenzaprine (Flexeril) 10 mg tablet Take 1 tablet (10 mg) by mouth 3 times a day as needed for muscle spasms. 30 tablet 2    hydroCHLOROthiazide (HYDRODiuril) 25 mg tablet Take 1 tablet (25 mg) by mouth once daily. 90 tablet 3    clindamycin (Cleocin T) 1 % gel Apply topically once daily in the morning. Apply to full face. (Patient not taking: Reported on 7/30/2025)      hydrocortisone 2.5 % cream Apply twice daily to affected areas of face (avoid eyes) for 2 weeks, then taper to twice daily on weekends only; repeat every 6-8 weeks as needed for flares (Patient not taking: Reported on 7/30/2025) 30 g 2    ketoconazole (NIZOral) 2 % cream Apply twice daily to affected areas of face (Patient not taking: Reported on 7/30/2025) 60 g 11    ketoconazole (NIZOral) 2 % shampoo Wash affected areas of scalp 2-3 times weekly as directed (Patient not taking: Reported on 7/30/2025) 120 mL 11    lisinopril 20 mg tablet Take 1 tablet (20 mg) by mouth once daily. 90 tablet 3    meloxicam (Mobic) 15 mg tablet TAKE 1 TABLET BY MOUTH EVERY DAY (Patient not taking: Reported on 7/30/2025) 30 tablet 1    naltrexone-bupropion (Contrave) 8-90 mg ER tablet Initiation: 1 tablet qAM days 1-7, then 1 tab BID days 8-14, then 2 tabs in AM + 1 tab in PM days 15-22, then 2 tabs BID. (Patient not taking: Reported on 7/30/2025) 360 tablet 0     No current facility-administered medications on file prior to visit.   [2]   Past Medical History:  Diagnosis Date    Acute vaginitis  08/22/2018    Acute vaginitis    Encounter for gynecological examination (general) (routine) without abnormal findings 09/18/2016    Visit for routine gyn exam    Localized edema 04/24/2017    Lower leg edema    Otalgia, left ear 04/27/2017    Otalgia, left    Pain in left shoulder 09/21/2016    Pain in shoulder region, left    Personal history of other diseases of the nervous system and sense organs 02/09/2018    History of drainage from eye    Personal history of other diseases of the respiratory system 02/01/2018    History of acute pharyngitis    Personal history of other infectious and parasitic diseases 08/17/2018    History of dermatophytosis    Reaction to severe stress, unspecified 03/14/2016    Situational stress    Shortness of breath 05/08/2018    SOB (shortness of breath) on exertion    Sprain of unspecified ligament of right ankle, initial encounter 03/14/2016    Right ankle sprain    Strain of unspecified muscle, fascia and tendon at shoulder and upper arm level, right arm, initial encounter 04/24/2017    Right shoulder strain    Unspecified acute conjunctivitis, left eye 02/09/2018    Acute bacterial conjunctivitis of left eye

## (undated) DEVICE — Device

## (undated) DEVICE — TROCAR SYSTEM, BALLOON, KII GELPORT, 12 X 100MM

## (undated) DEVICE — SYSTEM, TROCAR LAP, 5X100MM, SHIELD BLADED, KII ADVANCED FIX ABDOMINAL

## (undated) DEVICE — SCOPE WARMER, LAPAROSCOPE, BAG ONLY, LF

## (undated) DEVICE — APPLIER, 5MM ENDOSCOPIC, HEM-O-LOCK, W/15 ML CLIPS

## (undated) DEVICE — RETRIEVAL SYSTEM, MONARCH, 10MM DISP ENDOSCOPIC

## (undated) DEVICE — HOLSTER, JET SAFETY

## (undated) DEVICE — CLIP, ENDO, CLINCH II, W/RATCHET, ON/OFF, CLINCH II, 5 MM

## (undated) DEVICE — SUTURE, MONOCRYL, 4-0, 18 IN, PS2, UNDYED

## (undated) DEVICE — GLOVE, SURGICAL, PROTEXIS PI ORTHO, 7.0, PF, LF

## (undated) DEVICE — TUBE SET, PNEUMOLAR HEATED, SMOKE EVACU, HIGH-FLOW

## (undated) DEVICE — FILTRATION DEVICE, PLUME PORT SEO LAPAROSCOPIC

## (undated) DEVICE — SUTURE, VICRYL, 0, 27 IN, UR-6, VIOLET

## (undated) DEVICE — PUMP, STRYKERFLOW 2 & HANDPIECE W/10FT. IRRIGATION TUBING

## (undated) DEVICE — TUBESET, HIGH FLOW II, 45 LITER PNEUMO SURE, DISP.

## (undated) DEVICE — CANNULA, KII ADVANCED FIXATION, 5X100MM W/SEAL